# Patient Record
Sex: MALE | Race: WHITE | Employment: OTHER | URBAN - METROPOLITAN AREA
[De-identification: names, ages, dates, MRNs, and addresses within clinical notes are randomized per-mention and may not be internally consistent; named-entity substitution may affect disease eponyms.]

---

## 2021-07-24 ENCOUNTER — HOSPITAL ENCOUNTER (INPATIENT)
Age: 61
LOS: 8 days | Discharge: HOME | DRG: 080 | End: 2021-08-01
Attending: EMERGENCY MEDICINE | Admitting: FAMILY MEDICINE
Payer: MEDICAID

## 2021-07-24 ENCOUNTER — APPOINTMENT (OUTPATIENT)
Dept: GENERAL RADIOLOGY | Age: 61
DRG: 080 | End: 2021-07-24
Attending: EMERGENCY MEDICINE
Payer: MEDICAID

## 2021-07-24 DIAGNOSIS — H16.031: Primary | ICD-10-CM

## 2021-07-24 PROBLEM — C34.90 ADENOCARCINOMA OF LUNG (HCC): Status: ACTIVE | Noted: 2021-07-24

## 2021-07-24 PROBLEM — H16.001 CORNEAL ULCER, RIGHT: Status: ACTIVE | Noted: 2021-07-24

## 2021-07-24 PROBLEM — Z79.899 ON ANTINEOPLASTIC CHEMOTHERAPY: Status: ACTIVE | Noted: 2021-07-24

## 2021-07-24 LAB
ALBUMIN SERPL-MCNC: 3.7 G/DL (ref 3.2–4.6)
ALBUMIN/GLOB SERPL: 1 {RATIO} (ref 1.2–3.5)
ALP SERPL-CCNC: 105 U/L (ref 50–136)
ALT SERPL-CCNC: 32 U/L (ref 12–65)
ANION GAP SERPL CALC-SCNC: 4 MMOL/L (ref 7–16)
AST SERPL-CCNC: 11 U/L (ref 15–37)
BASOPHILS # BLD: 0 K/UL (ref 0–0.2)
BASOPHILS NFR BLD: 0 % (ref 0–2)
BILIRUB SERPL-MCNC: 0.3 MG/DL (ref 0.2–1.1)
BUN SERPL-MCNC: 17 MG/DL (ref 8–23)
CALCIUM SERPL-MCNC: 8.9 MG/DL (ref 8.3–10.4)
CHLORIDE SERPL-SCNC: 101 MMOL/L (ref 98–107)
CO2 SERPL-SCNC: 32 MMOL/L (ref 21–32)
CREAT SERPL-MCNC: 0.88 MG/DL (ref 0.8–1.5)
DIFFERENTIAL METHOD BLD: ABNORMAL
EOSINOPHIL # BLD: 0.1 K/UL (ref 0–0.8)
EOSINOPHIL NFR BLD: 2 % (ref 0.5–7.8)
ERYTHROCYTE [DISTWIDTH] IN BLOOD BY AUTOMATED COUNT: 14.6 % (ref 11.9–14.6)
GLOBULIN SER CALC-MCNC: 3.6 G/DL (ref 2.3–3.5)
GLUCOSE SERPL-MCNC: 117 MG/DL (ref 65–100)
HCT VFR BLD AUTO: 36.1 % (ref 41.1–50.3)
HGB BLD-MCNC: 11.6 G/DL (ref 13.6–17.2)
IMM GRANULOCYTES # BLD AUTO: 0.2 K/UL (ref 0–0.5)
IMM GRANULOCYTES NFR BLD AUTO: 4 % (ref 0–5)
LACTATE SERPL-SCNC: 0.7 MMOL/L (ref 0.4–2)
LYMPHOCYTES # BLD: 1.3 K/UL (ref 0.5–4.6)
LYMPHOCYTES NFR BLD: 28 % (ref 13–44)
MCH RBC QN AUTO: 29.7 PG (ref 26.1–32.9)
MCHC RBC AUTO-ENTMCNC: 32.1 G/DL (ref 31.4–35)
MCV RBC AUTO: 92.3 FL (ref 79.6–97.8)
MONOCYTES # BLD: 0.7 K/UL (ref 0.1–1.3)
MONOCYTES NFR BLD: 14 % (ref 4–12)
NEUTS SEG # BLD: 2.5 K/UL (ref 1.7–8.2)
NEUTS SEG NFR BLD: 52 % (ref 43–78)
NRBC # BLD: 0 K/UL (ref 0–0.2)
PLATELET # BLD AUTO: 249 K/UL (ref 150–450)
PMV BLD AUTO: 11.1 FL (ref 9.4–12.3)
POTASSIUM SERPL-SCNC: 3.9 MMOL/L (ref 3.5–5.1)
PROCALCITONIN SERPL-MCNC: <0.05 NG/ML
PROT SERPL-MCNC: 7.3 G/DL (ref 6.3–8.2)
RBC # BLD AUTO: 3.91 M/UL (ref 4.23–5.6)
SODIUM SERPL-SCNC: 137 MMOL/L (ref 136–145)
WBC # BLD AUTO: 4.8 K/UL (ref 4.3–11.1)

## 2021-07-24 PROCEDURE — 93005 ELECTROCARDIOGRAM TRACING: CPT | Performed by: EMERGENCY MEDICINE

## 2021-07-24 PROCEDURE — 80053 COMPREHEN METABOLIC PANEL: CPT

## 2021-07-24 PROCEDURE — 74011250636 HC RX REV CODE- 250/636: Performed by: EMERGENCY MEDICINE

## 2021-07-24 PROCEDURE — 74011000258 HC RX REV CODE- 258: Performed by: EMERGENCY MEDICINE

## 2021-07-24 PROCEDURE — 99284 EMERGENCY DEPT VISIT MOD MDM: CPT

## 2021-07-24 PROCEDURE — 85025 COMPLETE CBC W/AUTO DIFF WBC: CPT

## 2021-07-24 PROCEDURE — 99283 EMERGENCY DEPT VISIT LOW MDM: CPT

## 2021-07-24 PROCEDURE — 65270000029 HC RM PRIVATE

## 2021-07-24 PROCEDURE — 83605 ASSAY OF LACTIC ACID: CPT

## 2021-07-24 PROCEDURE — 87040 BLOOD CULTURE FOR BACTERIA: CPT

## 2021-07-24 PROCEDURE — 84145 PROCALCITONIN (PCT): CPT

## 2021-07-24 PROCEDURE — 94762 N-INVAS EAR/PLS OXIMTRY CONT: CPT

## 2021-07-24 PROCEDURE — 74011250636 HC RX REV CODE- 250/636: Performed by: FAMILY MEDICINE

## 2021-07-24 PROCEDURE — 74011250637 HC RX REV CODE- 250/637: Performed by: FAMILY MEDICINE

## 2021-07-24 PROCEDURE — 71045 X-RAY EXAM CHEST 1 VIEW: CPT

## 2021-07-24 RX ORDER — FOLIC ACID 1 MG/1
1 TABLET ORAL DAILY
COMMUNITY

## 2021-07-24 RX ORDER — SODIUM CHLORIDE 0.9 % (FLUSH) 0.9 %
5-40 SYRINGE (ML) INJECTION AS NEEDED
Status: DISCONTINUED | OUTPATIENT
Start: 2021-07-24 | End: 2021-08-01 | Stop reason: HOSPADM

## 2021-07-24 RX ORDER — ATORVASTATIN CALCIUM 80 MG/1
80 TABLET, FILM COATED ORAL
COMMUNITY

## 2021-07-24 RX ORDER — ENOXAPARIN SODIUM 100 MG/ML
40 INJECTION SUBCUTANEOUS DAILY
Status: DISCONTINUED | OUTPATIENT
Start: 2021-07-25 | End: 2021-08-01 | Stop reason: HOSPADM

## 2021-07-24 RX ORDER — DOXYCYCLINE 100 MG/1
100 CAPSULE ORAL 2 TIMES DAILY
COMMUNITY
End: 2021-08-01

## 2021-07-24 RX ORDER — MELATONIN
1000 2 TIMES DAILY
Status: DISCONTINUED | OUTPATIENT
Start: 2021-07-24 | End: 2021-08-01 | Stop reason: HOSPADM

## 2021-07-24 RX ORDER — ONDANSETRON 2 MG/ML
4 INJECTION INTRAMUSCULAR; INTRAVENOUS
Status: DISCONTINUED | OUTPATIENT
Start: 2021-07-24 | End: 2021-08-01 | Stop reason: HOSPADM

## 2021-07-24 RX ORDER — SPIRONOLACTONE 25 MG/1
25 TABLET ORAL DAILY
COMMUNITY

## 2021-07-24 RX ORDER — FUROSEMIDE 40 MG/1
40 TABLET ORAL DAILY
COMMUNITY

## 2021-07-24 RX ORDER — MOXIFLOXACIN 5 MG/ML
1 SOLUTION/ DROPS OPHTHALMIC
COMMUNITY

## 2021-07-24 RX ORDER — SODIUM CHLORIDE 0.9 % (FLUSH) 0.9 %
5-40 SYRINGE (ML) INJECTION EVERY 8 HOURS
Status: DISCONTINUED | OUTPATIENT
Start: 2021-07-24 | End: 2021-07-27

## 2021-07-24 RX ORDER — VITAMIN E 1000 UNIT
1000 CAPSULE ORAL DAILY
COMMUNITY

## 2021-07-24 RX ORDER — CIPROFLOXACIN 500 MG/1
500 TABLET ORAL 2 TIMES DAILY
COMMUNITY
End: 2021-08-01

## 2021-07-24 RX ORDER — ACETAMINOPHEN 325 MG/1
650 TABLET ORAL
Status: DISCONTINUED | OUTPATIENT
Start: 2021-07-24 | End: 2021-08-01 | Stop reason: HOSPADM

## 2021-07-24 RX ORDER — FUROSEMIDE 40 MG/1
40 TABLET ORAL DAILY
Status: DISCONTINUED | OUTPATIENT
Start: 2021-07-25 | End: 2021-08-01 | Stop reason: HOSPADM

## 2021-07-24 RX ORDER — CLOPIDOGREL BISULFATE 75 MG/1
75 TABLET ORAL DAILY
COMMUNITY

## 2021-07-24 RX ORDER — SODIUM CHLORIDE 0.9 % (FLUSH) 0.9 %
5-10 SYRINGE (ML) INJECTION EVERY 8 HOURS
Status: DISCONTINUED | OUTPATIENT
Start: 2021-07-24 | End: 2021-08-01 | Stop reason: HOSPADM

## 2021-07-24 RX ORDER — POLYETHYLENE GLYCOL 3350 17 G/17G
17 POWDER, FOR SOLUTION ORAL DAILY PRN
Status: DISCONTINUED | OUTPATIENT
Start: 2021-07-24 | End: 2021-08-01 | Stop reason: HOSPADM

## 2021-07-24 RX ORDER — SODIUM CHLORIDE 0.9 % (FLUSH) 0.9 %
5-10 SYRINGE (ML) INJECTION AS NEEDED
Status: DISCONTINUED | OUTPATIENT
Start: 2021-07-24 | End: 2021-08-01 | Stop reason: HOSPADM

## 2021-07-24 RX ORDER — SPIRONOLACTONE 25 MG/1
25 TABLET ORAL DAILY
Status: DISCONTINUED | OUTPATIENT
Start: 2021-07-25 | End: 2021-08-01 | Stop reason: HOSPADM

## 2021-07-24 RX ORDER — ASPIRIN 81 MG/1
81 TABLET ORAL DAILY
COMMUNITY

## 2021-07-24 RX ORDER — VANCOMYCIN 1.75 GRAM/500 ML IN 0.9 % SODIUM CHLORIDE INTRAVENOUS
1750 ONCE
Status: COMPLETED | OUTPATIENT
Start: 2021-07-24 | End: 2021-07-25

## 2021-07-24 RX ORDER — TOBRAMYCIN 3 MG/ML
1 SOLUTION/ DROPS OPHTHALMIC
COMMUNITY
End: 2021-08-01

## 2021-07-24 RX ORDER — ONDANSETRON 4 MG/1
4 TABLET, ORALLY DISINTEGRATING ORAL
Status: DISCONTINUED | OUTPATIENT
Start: 2021-07-24 | End: 2021-08-01 | Stop reason: HOSPADM

## 2021-07-24 RX ORDER — ACETAMINOPHEN 650 MG/1
650 SUPPOSITORY RECTAL
Status: DISCONTINUED | OUTPATIENT
Start: 2021-07-24 | End: 2021-08-01 | Stop reason: HOSPADM

## 2021-07-24 RX ORDER — MOXIFLOXACIN 5 MG/ML
1 SOLUTION/ DROPS OPHTHALMIC
Status: DISCONTINUED | OUTPATIENT
Start: 2021-07-24 | End: 2021-07-27 | Stop reason: CLARIF

## 2021-07-24 RX ADMIN — VANCOMYCIN HYDROCHLORIDE 1 DROP: 1 INJECTION, POWDER, LYOPHILIZED, FOR SOLUTION INTRAVENOUS at 23:00

## 2021-07-24 RX ADMIN — VANCOMYCIN HYDROCHLORIDE 1 DROP: 1 INJECTION, POWDER, LYOPHILIZED, FOR SOLUTION INTRAVENOUS at 22:04

## 2021-07-24 RX ADMIN — TOBRAMYCIN 1 DROP: 3 SOLUTION OPHTHALMIC at 22:04

## 2021-07-24 RX ADMIN — ACETAMINOPHEN 650 MG: 325 TABLET ORAL at 22:02

## 2021-07-24 RX ADMIN — VANCOMYCIN HYDROCHLORIDE 1750 MG: 10 INJECTION, POWDER, LYOPHILIZED, FOR SOLUTION INTRAVENOUS at 22:06

## 2021-07-24 RX ADMIN — PIPERACILLIN SODIUM AND TAZOBACTAM SODIUM 4.5 G: 4; .5 INJECTION, POWDER, LYOPHILIZED, FOR SOLUTION INTRAVENOUS at 18:02

## 2021-07-24 RX ADMIN — Medication 5 ML: at 22:10

## 2021-07-24 RX ADMIN — TOBRAMYCIN 1 DROP: 3 SOLUTION OPHTHALMIC at 20:05

## 2021-07-24 RX ADMIN — MOXIFLOXACIN 1 DROP: 5 SOLUTION/ DROPS OPHTHALMIC at 21:00

## 2021-07-24 RX ADMIN — VANCOMYCIN HYDROCHLORIDE 1 DROP: 1 INJECTION, POWDER, LYOPHILIZED, FOR SOLUTION INTRAVENOUS at 20:05

## 2021-07-24 RX ADMIN — SODIUM CHLORIDE, SODIUM LACTATE, POTASSIUM CHLORIDE, AND CALCIUM CHLORIDE 1000 ML: 600; 310; 30; 20 INJECTION, SOLUTION INTRAVENOUS at 16:05

## 2021-07-24 RX ADMIN — VANCOMYCIN HYDROCHLORIDE 1 DROP: 1 INJECTION, POWDER, LYOPHILIZED, FOR SOLUTION INTRAVENOUS at 21:00

## 2021-07-24 RX ADMIN — MOXIFLOXACIN 1 DROP: 5 SOLUTION/ DROPS OPHTHALMIC at 22:04

## 2021-07-24 RX ADMIN — TOBRAMYCIN 1 DROP: 3 SOLUTION OPHTHALMIC at 23:00

## 2021-07-24 RX ADMIN — MOXIFLOXACIN 1 DROP: 5 SOLUTION/ DROPS OPHTHALMIC at 20:05

## 2021-07-24 RX ADMIN — MOXIFLOXACIN 1 DROP: 5 SOLUTION/ DROPS OPHTHALMIC at 23:00

## 2021-07-24 RX ADMIN — TOBRAMYCIN 1 DROP: 3 SOLUTION OPHTHALMIC at 21:00

## 2021-07-24 NOTE — ED NOTES
TRANSFER - OUT REPORT:    Verbal report given to Neftaly Dooley (name) on Arnoldo Cantu  being transferred to 3rd floor (unit) for routine progression of care       Report consisted of patients Situation, Background, Assessment and   Recommendations(SBAR). Information from the following report(s) SBAR, ED Summary and MAR was reviewed with the receiving nurse. Lines:   Peripheral IV 07/24/21 Right Antecubital (Active)   Site Assessment Clean, dry, & intact 07/24/21 1447   Phlebitis Assessment 0 07/24/21 1447   Infiltration Assessment 0 07/24/21 1447   Dressing Status Clean, dry, & intact 07/24/21 1447       Peripheral IV 07/24/21 Left Hand (Active)        Opportunity for questions and clarification was provided.       Patient transported with:   "[employer]

## 2021-07-24 NOTE — ROUTINE PROCESS
TRANSFER - IN REPORT:    Verbal report received from ER RN on Virginia Evans being received from ER for routine progression of care. Report consisted of patients Situation, Background, Assessment and Recommendations(SBAR). Information from the following report(s) SBAR, Kardex, ED Summary, Intake/Output and MAR was reviewed. Opportunity for questions and clarification was provided. Report taken for Primary RN Jens. Pt is nonmonitored.

## 2021-07-24 NOTE — ED PROVIDER NOTES
49-year-old male complains of right eye infection starting over a week ago. Patient states the initial insult was a foreign body of the eye when he was working out in the yard. He followed up at [facility] emergency department a couple days following that for eye pain and foreign body sensation, was eventually discharged home on amoxicillin orally and some antibiotic ointment. Patient continued to do poorly and followed up on Monday with ophthalmology. At that time he was placed on oral Cipro, doxycycline, tobramycin ophthalmic drops every hour, moxifloxacin ophthalmic drops every hour and vancomycin eyedrops. Patient was also placed on vitamin D twice daily. On recheck today the patient's condition had worsened significantly, and attempts to directly admit the patient via the ophthalmologist were unsuccessful due to no beds. Patient was sent to the ER for IV antibiotics and admission by the hospitalist, and Dr. Kolby Sadler of [facility] eye will consult. He requested patient be n.p.o. after midnight for possible surgical debridement tomorrow if necessary. The history is provided by the patient. Eye Pain   This is a new problem. The current episode started more than 1 week ago. The problem occurs constantly. The problem has been gradually worsening. The right eye is affected. The injury mechanism was a foreign body. The patient is experiencing no pain. There is history of trauma to the eye. There is no known exposure to pink eye. He does not wear contacts. Associated symptoms include blurred vision, decreased vision, eye redness and pain. Pertinent negatives include no numbness, no discharge, no double vision, no foreign body sensation, no photophobia, no nausea, no vomiting, no tingling, no weakness, no itching, no fever, no head injury and no dizziness. Treatments tried: Biotic eyedrops. No past medical history on file. No past surgical history on file. No family history on file.     Social History Socioeconomic History    Marital status: Not on file     Spouse name: Not on file    Number of children: Not on file    Years of education: Not on file    Highest education level: Not on file   Occupational History    Not on file   Tobacco Use    Smoking status: Not on file   Substance and Sexual Activity    Alcohol use: Not on file    Drug use: Not on file    Sexual activity: Not on file   Other Topics Concern    Not on file   Social History Narrative    Not on file     Social Determinants of Health     Financial Resource Strain:     Difficulty of Paying Living Expenses:    Food Insecurity:     Worried About Running Out of Food in the Last Year:     [address] in the Last Year:    Transportation Needs:     Lack of Transportation (Medical):  Lack of Transportation (Non-Medical):    Physical Activity:     Days of Exercise per Week:     Minutes of Exercise per Session:    Stress:     Feeling of Stress :    Social Connections:     Frequency of Communication with Friends and Family:     Frequency of Social Gatherings with Friends and Family:     Attends [employer] Services:     Active Member of Clubs or Organizations:     Attends Club or Organization Meetings:     Marital Status:    Intimate Partner Violence:     Fear of Current or Ex-Partner:     Emotionally Abused:     Physically Abused:     Sexually Abused: ALLERGIES: Patient has no known allergies. Review of Systems   Constitutional: Negative for chills and fever. Eyes: Positive for blurred vision, pain, redness and visual disturbance. Negative for double vision, photophobia and discharge. Gastrointestinal: Negative for nausea and vomiting. Skin: Negative for itching. Neurological: Negative for dizziness, tingling, weakness and numbness. All other systems reviewed and are negative.       Vitals:    07/24/21 1445   BP: 126/78   Pulse: 87   Resp: 16   Temp: 98.1 °F (36.7 °C)   SpO2: 97%   Weight: 72.6 kg (160 lb)   Height: 5' 6\" (1.676 m)            Physical Exam  Vitals and nursing note reviewed. Constitutional:       General: He is not in acute distress. Appearance: He is well-developed. HENT:      Head: Normocephalic and atraumatic. Right Ear: External ear normal.      Left Ear: External ear normal.   Eyes:      General: Lids are normal.         Right eye: Discharge present. Left eye: No foreign body, discharge or hordeolum. Extraocular Movements: Extraocular movements intact. Conjunctiva/sclera:      Right eye: Right conjunctiva is injected. Chemosis and exudate present. No hemorrhage. Left eye: Left conjunctiva is not injected. No chemosis, exudate or hemorrhage. Slit lamp exam:     Right eye: Hypopyon present. Cardiovascular:      Rate and Rhythm: Normal rate and regular rhythm. Heart sounds: Normal heart sounds. Pulmonary:      Effort: Pulmonary effort is normal.      Breath sounds: Normal breath sounds. Abdominal:      General: Bowel sounds are normal.      Palpations: Abdomen is soft. Tenderness: There is no abdominal tenderness. Musculoskeletal:         General: Normal range of motion. Cervical back: Normal range of motion and neck supple. Skin:     General: Skin is warm and dry. Capillary Refill: Capillary refill takes less than 2 seconds. Neurological:      Mental Status: He is alert and oriented to person, place, and time. Cranial Nerves: Cranial nerves are intact. Sensory: Sensation is intact. Motor: Motor function is intact.    Psychiatric:         Mood and Affect: Mood normal.         Speech: Speech normal.         Cognition and Memory: Cognition and memory normal.          MDM  Number of Diagnoses or Management Options  Corneal ulcer, hypopyon, right: new and requires workup     Amount and/or Complexity of Data Reviewed  Clinical lab tests: ordered and reviewed  Review and summarize past medical records: yes  Discuss the patient with other providers: yes    Risk of Complications, Morbidity, and/or Mortality  Presenting problems: moderate  Diagnostic procedures: minimal  Management options: moderate    Patient Progress  Patient progress: stable         Procedures    The patient was observed in the ED. discussed case with Dr. Maci Mills of ophthalmology, recommends IV antibiotics and admission by hospitalist and n.p.o. after midnight. Patient is to continue his tobramycin and moxifloxacin eyedrops. He stated the doxycycline was primarily for the anti-inflammatory properties as well as the vitamin D 1000 mg twice daily. Results Reviewed:      Recent Results (from the past 24 hour(s))   LACTIC ACID    Collection Time: 07/24/21  2:52 PM   Result Value Ref Range    Lactic acid 0.7 0.4 - 2.0 MMOL/L   CBC WITH AUTOMATED DIFF    Collection Time: 07/24/21  2:52 PM   Result Value Ref Range    WBC 4.8 4.3 - 11.1 K/uL    RBC 3.91 (L) 4.23 - 5.6 M/uL    HGB 11.6 (L) 13.6 - 17.2 g/dL    HCT 36.1 (L) 41.1 - 50.3 %    MCV 92.3 79.6 - 97.8 FL    MCH 29.7 26.1 - 32.9 PG    MCHC 32.1 31.4 - 35.0 g/dL    RDW 14.6 11.9 - 14.6 %    PLATELET 979 722 - 371 K/uL    MPV 11.1 9.4 - 12.3 FL    ABSOLUTE NRBC 0.00 0.0 - 0.2 K/uL    DF AUTOMATED      NEUTROPHILS 52 43 - 78 %    LYMPHOCYTES 28 13 - 44 %    MONOCYTES 14 (H) 4.0 - 12.0 %    EOSINOPHILS 2 0.5 - 7.8 %    BASOPHILS 0 0.0 - 2.0 %    IMMATURE GRANULOCYTES 4 0.0 - 5.0 %    ABS. NEUTROPHILS 2.5 1.7 - 8.2 K/UL    ABS. LYMPHOCYTES 1.3 0.5 - 4.6 K/UL    ABS. MONOCYTES 0.7 0.1 - 1.3 K/UL    ABS. EOSINOPHILS 0.1 0.0 - 0.8 K/UL    ABS. BASOPHILS 0.0 0.0 - 0.2 K/UL    ABS. IMM.  GRANS. 0.2 0.0 - 0.5 K/UL   METABOLIC PANEL, COMPREHENSIVE    Collection Time: 07/24/21  2:52 PM   Result Value Ref Range    Sodium 137 136 - 145 mmol/L    Potassium 3.9 3.5 - 5.1 mmol/L    Chloride 101 98 - 107 mmol/L    CO2 32 21 - 32 mmol/L    Anion gap 4 (L) 7 - 16 mmol/L    Glucose 117 (H) 65 - 100 mg/dL    BUN 17 8 - 23 MG/DL    Creatinine 0.88 0.8 - 1.5 MG/DL    GFR est AA >60 >60 ml/min/1.73m2    GFR est non-AA >60 >60 ml/min/1.73m2    Calcium 8.9 8.3 - 10.4 MG/DL    Bilirubin, total 0.3 0.2 - 1.1 MG/DL    ALT (SGPT) 32 12 - 65 U/L    AST (SGOT) 11 (L) 15 - 37 U/L    Alk. phosphatase 105 50 - 136 U/L    Protein, total 7.3 6.3 - 8.2 g/dL    Albumin 3.7 3.2 - 4.6 g/dL    Globulin 3.6 (H) 2.3 - 3.5 g/dL    A-G Ratio 1.0 (L) 1.2 - 3.5     PROCALCITONIN    Collection Time: 07/24/21  2:52 PM   Result Value Ref Range    Procalcitonin <0.05 ng/mL   EKG, 12 LEAD, INITIAL    Collection Time: 07/24/21  3:26 PM   Result Value Ref Range    Ventricular Rate 82 BPM    Atrial Rate 82 BPM    P-R Interval 200 ms    QRS Duration 90 ms    Q-T Interval 346 ms    QTC Calculation (Bezet) 404 ms    Calculated P Axis 75 degrees    Calculated R Axis 78 degrees    Calculated T Axis -80 degrees    Diagnosis       !! AGE AND GENDER SPECIFIC ECG ANALYSIS !!   Normal sinus rhythm  Possible Anteroseptal infarct , age undetermined  T wave abnormality, consider inferolateral ischemia  Abnormal ECG  No previous ECGs available

## 2021-07-24 NOTE — H&P
Ar Hospitalist Note     Admit Date:  2021  3:18 PM   Name:  Casa Dunn   Age:  59 y.o.  :  1960   MRN:  [id number]   PCP:  None  Treatment Team: Attending Provider: Beth Simms MD; Primary Nurse: Huy Lopez RN    HPI/Subjective:     Patient is a 59-year-old man with past medical history significant for CAD, adenocarcinoma of lung, on chemotherapy presented to the ED referred by ophthalmology for failed outpatient therapy for eye infection. Patient reports he was working in his yard on  when a piece of grass flew into his right eye. He flushed his eye with warm tap water but next day he woke up with blurred vision, painful red eye. He was seen at [facility] ED and was discharged home on amoxicillin and some antibiotic ointment. Patient reports symptoms persistent and he was seen by ophthalmology on  and was placed on oral Cipro, doxycycline and tobramycin ophthalmic drops every hour, moxifloxacin ophthalmic drops every hour and vancomycin eyedrops. Patient reports symptom has had worsened significantly with persistent discharge, and no vision in RT eye. He was evaluated by ophthalmology who tried to admit patient directly but no bed was available. Patient was sent to the ER for IV antibiotics and Dr. Kwesi Plaza of [facility] will see patient in the morning. Patient denies any other symptoms. Denies fever, chills, nausea, vomiting, abdominal pain, shortness of breath or chest pain. In the ED patient was vitally stable. Labs unremarkable. Hospitalist consulted for admission.     Assessment and Plan:     Right corneal ulcer/hypopyon:  Cultures from [facility] showed pansensitive Pseudomonas  Start patient on vancomycin/Zosyn, pharmacy consulted to adjust dose  Dr. Kwesi Plaza of [address] consulted  ED spoke to Dr. Kwesi Plaza who recommend patient be n.p.o. after midnight for possible surgical debridement tomorrow  Continue tobramycin and moxifloxacin eyedrops  Continue vitamin D 1000 mg twice daily    CHF/CAD:  Continue Lasix, carvedilol and spironolactone    Adenocarcinoma of lung, on chemotherapy:  Outpatient oncology follow-up    Discharge planning: Admit patient to the medical floor    DVT ppx ordered  Code status:  Full  Estimated LOS:  Greater than 2 midnights  Risk:  high    Hospital Problems as of 7/24/2021 Never Reviewed        Codes Class Noted - Resolved POA    Corneal ulcer, right ICD-10-CM: H16.001  ICD-9-CM: 370.00  7/24/2021 - Present Unknown        Adenocarcinoma of lung ([facility] [address] ICD-10-CM: C34.90  ICD-9-CM: 162.9  7/24/2021 - Present Unknown        On antineoplastic chemotherapy ICD-10-CM: Z79.899  ICD-9-CM: V58.69  7/24/2021 - Present Unknown                10 systems reviewed and negative except as noted in HPI. No past medical history on file. No past surgical history on file. No Known Allergies   Social History     Tobacco Use    Smoking status: Not on file   Substance Use Topics    Alcohol use: Not on file      No family history on file. Family history reviewed and noncontributory. There is no immunization history on file for this patient. PTA Medications:  None       Objective:     Patient Vitals for the past 24 hrs:   Temp Pulse Resp BP SpO2   07/24/21 1644  81   97 %   07/24/21 1555  91  132/85 98 %   07/24/21 1445 98.1 °F (36.7 °C) 87 16 126/78 97 %     Oxygen Therapy  O2 Sat (%): 97 % (07/24/21 1644)  Pulse via Oximetry: 82 beats per minute (07/24/21 1644)  O2 Device: None (Room air) (07/24/21 1445)    Estimated body mass index is 25.82 kg/m² as calculated from the following:    Height as of this encounter: 5' 6\" (1.676 m). Weight as of this encounter: 72.6 kg (160 lb).       Intake/Output Summary (Last 24 hours) at 7/24/2021 1759  Last data filed at 7/24/2021 1751  Gross per 24 hour   Intake 1000 ml   Output    Net 1000 ml       *Note that automatically entered I/Os may not be accurate; dependent on patient compliance with collection and accurate  by assistants. Physical Exam:  General:    Well nourished. Alert. Eyes:   Right eye with large amount of discharge to the cornea, visible hypopyon and swollen injected sclera  HENT:  Normocephalic, atraumatic. Moist mucous membranes  CV:   RRR. No m/r/g. Lungs:  CTAB. No wheezing, rhonchi, or rales. Abdomen: Soft, nontender, nondistended. Extremities: Warm and dry. No cyanosis or edema. Neurologic: CN II-XII grossly intact. Sensation intact. Skin:     No rashes or jaundice. Normal coloration  Psych:  Normal mood and affect. I reviewed the labs, imaging, EKGs, telemetry, and other studies done this admission. Data Reviewed:   Recent Results (from the past 24 hour(s))   LACTIC ACID    Collection Time: 07/24/21  2:52 PM   Result Value Ref Range    Lactic acid 0.7 0.4 - 2.0 MMOL/L   CBC WITH AUTOMATED DIFF    Collection Time: 07/24/21  2:52 PM   Result Value Ref Range    WBC 4.8 4.3 - 11.1 K/uL    RBC 3.91 (L) 4.23 - 5.6 M/uL    HGB 11.6 (L) 13.6 - 17.2 g/dL    HCT 36.1 (L) 41.1 - 50.3 %    MCV 92.3 79.6 - 97.8 FL    MCH 29.7 26.1 - 32.9 PG    MCHC 32.1 31.4 - 35.0 g/dL    RDW 14.6 11.9 - 14.6 %    PLATELET 691 867 - 646 K/uL    MPV 11.1 9.4 - 12.3 FL    ABSOLUTE NRBC 0.00 0.0 - 0.2 K/uL    DF AUTOMATED      NEUTROPHILS 52 43 - 78 %    LYMPHOCYTES 28 13 - 44 %    MONOCYTES 14 (H) 4.0 - 12.0 %    EOSINOPHILS 2 0.5 - 7.8 %    BASOPHILS 0 0.0 - 2.0 %    IMMATURE GRANULOCYTES 4 0.0 - 5.0 %    ABS. NEUTROPHILS 2.5 1.7 - 8.2 K/UL    ABS. LYMPHOCYTES 1.3 0.5 - 4.6 K/UL    ABS. MONOCYTES 0.7 0.1 - 1.3 K/UL    ABS. EOSINOPHILS 0.1 0.0 - 0.8 K/UL    ABS. BASOPHILS 0.0 0.0 - 0.2 K/UL    ABS. IMM.  GRANS. 0.2 0.0 - 0.5 K/UL   METABOLIC PANEL, COMPREHENSIVE    Collection Time: 07/24/21  2:52 PM   Result Value Ref Range    Sodium 137 136 - 145 mmol/L    Potassium 3.9 3.5 - 5.1 mmol/L    Chloride 101 98 - 107 mmol/L    CO2 32 21 - 32 mmol/L    Anion gap 4 (L) 7 - 16 mmol/L    Glucose 117 (H) 65 - 100 mg/dL BUN 17 8 - 23 MG/DL    Creatinine 0.88 0.8 - 1.5 MG/DL    GFR est AA >60 >60 ml/min/1.73m2    GFR est non-AA >60 >60 ml/min/1.73m2    Calcium 8.9 8.3 - 10.4 MG/DL    Bilirubin, total 0.3 0.2 - 1.1 MG/DL    ALT (SGPT) 32 12 - 65 U/L    AST (SGOT) 11 (L) 15 - 37 U/L    Alk. phosphatase 105 50 - 136 U/L    Protein, total 7.3 6.3 - 8.2 g/dL    Albumin 3.7 3.2 - 4.6 g/dL    Globulin 3.6 (H) 2.3 - 3.5 g/dL    A-G Ratio 1.0 (L) 1.2 - 3.5     PROCALCITONIN    Collection Time: 07/24/21  2:52 PM   Result Value Ref Range    Procalcitonin <0.05 ng/mL   EKG, 12 LEAD, INITIAL    Collection Time: 07/24/21  3:26 PM   Result Value Ref Range    Ventricular Rate 82 BPM    Atrial Rate 82 BPM    P-R Interval 200 ms    QRS Duration 90 ms    Q-T Interval 346 ms    QTC Calculation (Bezet) 404 ms    Calculated P Axis 75 degrees    Calculated R Axis 78 degrees    Calculated T Axis -80 degrees    Diagnosis       !! AGE AND GENDER SPECIFIC ECG ANALYSIS !!   Normal sinus rhythm  Possible Anteroseptal infarct , age undetermined  T wave abnormality, consider inferolateral ischemia  Abnormal ECG  No previous ECGs available         All Micro Results     Procedure Component Value Units Date/Time    BLOOD CULTURE [[id number]] Collected: 07/24/21 1530    Order Status: Completed Specimen: Blood Updated: 07/24/21 1737    BLOOD CULTURE [[id number]] Collected: 07/24/21 1452    Order Status: Completed Specimen: Blood Updated: 07/24/21 1509          Current Facility-Administered Medications   Medication Dose Route Frequency    sodium chloride (NS) flush 5-10 mL  5-10 mL IntraVENous Q8H    sodium chloride (NS) flush 5-10 mL  5-10 mL IntraVENous PRN    piperacillin-tazobactam (ZOSYN) 4.5 g in 0.9% sodium chloride (MBP/ADV) 100 mL MBP  4.5 g IntraVENous NOW    piperacillin-tazobactam (ZOSYN) 4.5 g in 0.9% sodium chloride (MBP/ADV) 100 mL MBP  4.5 g IntraVENous Q8H    [START ON 7/25/2021] spironolactone (ALDACTONE) tablet 25 mg  25 mg Oral DAILY    [START ON 7/25/2021] furosemide (LASIX) tablet 40 mg  40 mg Oral DAILY    vancomycin (VANCOCIN) 1750 mg in  ml infusion  1,750 mg IntraVENous ONCE    cholecalciferol (VITAMIN D3) (1000 Units /25 mcg) tablet 1,000 Units  1,000 Units Oral BID     No current outpatient medications on file. Other Studies:  No results found for this visit on 07/24/21. XR CHEST PORT    Result Date: 7/24/2021  History: Infection Exam: portable chest Comparison: None Findings: A port overlies the right chest wall. No focal alveolar infiltrate. Median sternotomy wires present. No pneumothorax. IMPRESSIONs: No acute findings. SARS-CoV-2 Lab Results  \"Novel Coronavirus\" Test: No results found for: COV2NT   \"Emergent Disease\" Test: No results found for: EDPR  \"SARS-COV-2\" Test: No results found for: XGCOVT  Rapid Test: No results found for: COVR            Part of this note was written by using a voice dictation software and the note has been proof read but may still contain some grammatical/other typographical errors.     Signed:  Francisco Chang MD

## 2021-07-24 NOTE — ED TRIAGE NOTES
Pt ambulatory to triage. Pt reports being sent by Dr. Kwesi Plaza for IV ABT. Pt has been on ABT gtt for one week and has no improvement. Pt states he was given oral ABT and ABT ointment from [facility] last Thursday. Pt states started after he had something fly in it while weedeating the yard.

## 2021-07-25 LAB
ANION GAP SERPL CALC-SCNC: 2 MMOL/L (ref 7–16)
ATRIAL RATE: 82 BPM
BASOPHILS # BLD: 0 K/UL (ref 0–0.2)
BASOPHILS NFR BLD: 1 % (ref 0–2)
BUN SERPL-MCNC: 14 MG/DL (ref 8–23)
CALCIUM SERPL-MCNC: 9 MG/DL (ref 8.3–10.4)
CALCULATED P AXIS, ECG09: 75 DEGREES
CALCULATED R AXIS, ECG10: 78 DEGREES
CALCULATED T AXIS, ECG11: -80 DEGREES
CHLORIDE SERPL-SCNC: 106 MMOL/L (ref 98–107)
CO2 SERPL-SCNC: 31 MMOL/L (ref 21–32)
CREAT SERPL-MCNC: 0.71 MG/DL (ref 0.8–1.5)
DIAGNOSIS, 93000: NORMAL
DIFFERENTIAL METHOD BLD: ABNORMAL
EOSINOPHIL # BLD: 0.1 K/UL (ref 0–0.8)
EOSINOPHIL NFR BLD: 1 % (ref 0.5–7.8)
ERYTHROCYTE [DISTWIDTH] IN BLOOD BY AUTOMATED COUNT: 14.6 % (ref 11.9–14.6)
EST. AVERAGE GLUCOSE BLD GHB EST-MCNC: 117 MG/DL
GLUCOSE BLD STRIP.AUTO-MCNC: 118 MG/DL (ref 65–100)
GLUCOSE BLD STRIP.AUTO-MCNC: 148 MG/DL (ref 65–100)
GLUCOSE SERPL-MCNC: 98 MG/DL (ref 65–100)
HBA1C MFR BLD: 5.7 % (ref 4.2–6.3)
HCT VFR BLD AUTO: 39.4 % (ref 41.1–50.3)
HGB BLD-MCNC: 12.5 G/DL (ref 13.6–17.2)
IMM GRANULOCYTES # BLD AUTO: 0.3 K/UL (ref 0–0.5)
IMM GRANULOCYTES NFR BLD AUTO: 5 % (ref 0–5)
LYMPHOCYTES # BLD: 1.4 K/UL (ref 0.5–4.6)
LYMPHOCYTES NFR BLD: 24 % (ref 13–44)
MCH RBC QN AUTO: 29.8 PG (ref 26.1–32.9)
MCHC RBC AUTO-ENTMCNC: 31.7 G/DL (ref 31.4–35)
MCV RBC AUTO: 94 FL (ref 79.6–97.8)
MONOCYTES # BLD: 0.7 K/UL (ref 0.1–1.3)
MONOCYTES NFR BLD: 12 % (ref 4–12)
NEUTS SEG # BLD: 3.3 K/UL (ref 1.7–8.2)
NEUTS SEG NFR BLD: 57 % (ref 43–78)
NRBC # BLD: 0.02 K/UL (ref 0–0.2)
P-R INTERVAL, ECG05: 200 MS
PLATELET # BLD AUTO: 220 K/UL (ref 150–450)
PMV BLD AUTO: 11.6 FL (ref 9.4–12.3)
POTASSIUM SERPL-SCNC: 4 MMOL/L (ref 3.5–5.1)
Q-T INTERVAL, ECG07: 346 MS
QRS DURATION, ECG06: 90 MS
QTC CALCULATION (BEZET), ECG08: 404 MS
RBC # BLD AUTO: 4.19 M/UL (ref 4.23–5.6)
SERVICE CMNT-IMP: ABNORMAL
SERVICE CMNT-IMP: ABNORMAL
SODIUM SERPL-SCNC: 139 MMOL/L (ref 136–145)
VENTRICULAR RATE, ECG03: 82 BPM
WBC # BLD AUTO: 5.8 K/UL (ref 4.3–11.1)

## 2021-07-25 PROCEDURE — 65270000029 HC RM PRIVATE

## 2021-07-25 PROCEDURE — 74011250637 HC RX REV CODE- 250/637: Performed by: FAMILY MEDICINE

## 2021-07-25 PROCEDURE — 74011250636 HC RX REV CODE- 250/636: Performed by: FAMILY MEDICINE

## 2021-07-25 PROCEDURE — 2709999900 HC NON-CHARGEABLE SUPPLY

## 2021-07-25 PROCEDURE — 74011250636 HC RX REV CODE- 250/636: Performed by: INTERNAL MEDICINE

## 2021-07-25 PROCEDURE — 74011000258 HC RX REV CODE- 258: Performed by: FAMILY MEDICINE

## 2021-07-25 PROCEDURE — 80048 BASIC METABOLIC PNL TOTAL CA: CPT

## 2021-07-25 PROCEDURE — 36415 COLL VENOUS BLD VENIPUNCTURE: CPT

## 2021-07-25 PROCEDURE — 82962 GLUCOSE BLOOD TEST: CPT

## 2021-07-25 PROCEDURE — 83036 HEMOGLOBIN GLYCOSYLATED A1C: CPT

## 2021-07-25 PROCEDURE — 74011250636 HC RX REV CODE- 250/636

## 2021-07-25 PROCEDURE — 74011250637 HC RX REV CODE- 250/637: Performed by: INTERNAL MEDICINE

## 2021-07-25 PROCEDURE — 74011000250 HC RX REV CODE- 250: Performed by: FAMILY MEDICINE

## 2021-07-25 PROCEDURE — 74011000250 HC RX REV CODE- 250

## 2021-07-25 PROCEDURE — 85025 COMPLETE CBC W/AUTO DIFF WBC: CPT

## 2021-07-25 RX ORDER — OXYCODONE HYDROCHLORIDE 5 MG/1
5 TABLET ORAL
Status: DISCONTINUED | OUTPATIENT
Start: 2021-07-25 | End: 2021-08-01 | Stop reason: HOSPADM

## 2021-07-25 RX ORDER — SODIUM CHLORIDE, SODIUM LACTATE, POTASSIUM CHLORIDE, CALCIUM CHLORIDE 600; 310; 30; 20 MG/100ML; MG/100ML; MG/100ML; MG/100ML
75 INJECTION, SOLUTION INTRAVENOUS CONTINUOUS
Status: DISCONTINUED | OUTPATIENT
Start: 2021-07-25 | End: 2021-07-25

## 2021-07-25 RX ORDER — SODIUM CHLORIDE, SODIUM LACTATE, POTASSIUM CHLORIDE, CALCIUM CHLORIDE 600; 310; 30; 20 MG/100ML; MG/100ML; MG/100ML; MG/100ML
75 INJECTION, SOLUTION INTRAVENOUS CONTINUOUS
Status: DISPENSED | OUTPATIENT
Start: 2021-07-25 | End: 2021-07-26

## 2021-07-25 RX ORDER — ASPIRIN 81 MG/1
81 TABLET ORAL DAILY
Status: DISCONTINUED | OUTPATIENT
Start: 2021-07-25 | End: 2021-08-01 | Stop reason: HOSPADM

## 2021-07-25 RX ADMIN — MOXIFLOXACIN 1 DROP: 5 SOLUTION/ DROPS OPHTHALMIC at 07:55

## 2021-07-25 RX ADMIN — VANCOMYCIN HYDROCHLORIDE 1 DROP: 1 INJECTION, POWDER, LYOPHILIZED, FOR SOLUTION INTRAVENOUS at 05:10

## 2021-07-25 RX ADMIN — MOXIFLOXACIN 1 DROP: 5 SOLUTION/ DROPS OPHTHALMIC at 23:05

## 2021-07-25 RX ADMIN — VANCOMYCIN HYDROCHLORIDE 1 DROP: 1 INJECTION, POWDER, LYOPHILIZED, FOR SOLUTION INTRAVENOUS at 07:17

## 2021-07-25 RX ADMIN — ASPIRIN 81 MG: 81 TABLET ORAL at 10:21

## 2021-07-25 RX ADMIN — TOBRAMYCIN 1 DROP: 3 SOLUTION OPHTHALMIC at 18:20

## 2021-07-25 RX ADMIN — VANCOMYCIN HYDROCHLORIDE 1 DROP: 1 INJECTION, POWDER, LYOPHILIZED, FOR SOLUTION INTRAVENOUS at 02:55

## 2021-07-25 RX ADMIN — Medication 5 ML: at 20:40

## 2021-07-25 RX ADMIN — VANCOMYCIN HYDROCHLORIDE 1 DROP: 1 INJECTION, POWDER, LYOPHILIZED, FOR SOLUTION INTRAVENOUS at 20:05

## 2021-07-25 RX ADMIN — VANCOMYCIN HYDROCHLORIDE 1 DROP: 1 INJECTION, POWDER, LYOPHILIZED, FOR SOLUTION INTRAVENOUS at 16:52

## 2021-07-25 RX ADMIN — VITAMIN D, TAB 1000IU (100/BT) 1000 UNITS: 25 TAB at 09:04

## 2021-07-25 RX ADMIN — SPIRONOLACTONE 25 MG: 25 TABLET ORAL at 09:04

## 2021-07-25 RX ADMIN — MOXIFLOXACIN 1 DROP: 5 SOLUTION/ DROPS OPHTHALMIC at 18:19

## 2021-07-25 RX ADMIN — TOBRAMYCIN 1 DROP: 3 SOLUTION OPHTHALMIC at 15:58

## 2021-07-25 RX ADMIN — MOXIFLOXACIN 1 DROP: 5 SOLUTION/ DROPS OPHTHALMIC at 20:53

## 2021-07-25 RX ADMIN — TOBRAMYCIN 1 DROP: 3 SOLUTION OPHTHALMIC at 16:52

## 2021-07-25 RX ADMIN — MOXIFLOXACIN 1 DROP: 5 SOLUTION/ DROPS OPHTHALMIC at 15:09

## 2021-07-25 RX ADMIN — PIPERACILLIN SODIUM AND TAZOBACTAM SODIUM 4.5 G: 4; .5 INJECTION, POWDER, LYOPHILIZED, FOR SOLUTION INTRAVENOUS at 18:08

## 2021-07-25 RX ADMIN — VANCOMYCIN HYDROCHLORIDE 1000 MG: 1 INJECTION, POWDER, LYOPHILIZED, FOR SOLUTION INTRAVENOUS at 09:05

## 2021-07-25 RX ADMIN — VANCOMYCIN HYDROCHLORIDE 1 DROP: 1 INJECTION, POWDER, LYOPHILIZED, FOR SOLUTION INTRAVENOUS at 23:04

## 2021-07-25 RX ADMIN — TOBRAMYCIN 1 DROP: 3 SOLUTION OPHTHALMIC at 11:10

## 2021-07-25 RX ADMIN — MOXIFLOXACIN 1 DROP: 5 SOLUTION/ DROPS OPHTHALMIC at 07:15

## 2021-07-25 RX ADMIN — ACETAMINOPHEN 650 MG: 325 TABLET ORAL at 14:28

## 2021-07-25 RX ADMIN — TOBRAMYCIN 1 DROP: 3 SOLUTION OPHTHALMIC at 14:18

## 2021-07-25 RX ADMIN — VANCOMYCIN HYDROCHLORIDE 1 DROP: 1 INJECTION, POWDER, LYOPHILIZED, FOR SOLUTION INTRAVENOUS at 15:09

## 2021-07-25 RX ADMIN — VANCOMYCIN HYDROCHLORIDE 1 DROP: 1 INJECTION, POWDER, LYOPHILIZED, FOR SOLUTION INTRAVENOUS at 06:06

## 2021-07-25 RX ADMIN — TOBRAMYCIN 1 DROP: 3 SOLUTION OPHTHALMIC at 20:05

## 2021-07-25 RX ADMIN — VANCOMYCIN HYDROCHLORIDE 1 DROP: 1 INJECTION, POWDER, LYOPHILIZED, FOR SOLUTION INTRAVENOUS at 03:46

## 2021-07-25 RX ADMIN — MOXIFLOXACIN 1 DROP: 5 SOLUTION/ DROPS OPHTHALMIC at 11:10

## 2021-07-25 RX ADMIN — MOXIFLOXACIN 1 DROP: 5 SOLUTION/ DROPS OPHTHALMIC at 00:04

## 2021-07-25 RX ADMIN — MOXIFLOXACIN 1 DROP: 5 SOLUTION/ DROPS OPHTHALMIC at 19:20

## 2021-07-25 RX ADMIN — VITAMIN D, TAB 1000IU (100/BT) 1000 UNITS: 25 TAB at 18:20

## 2021-07-25 RX ADMIN — TOBRAMYCIN 1 DROP: 3 SOLUTION OPHTHALMIC at 22:10

## 2021-07-25 RX ADMIN — PIPERACILLIN SODIUM AND TAZOBACTAM SODIUM 4.5 G: 4; .5 INJECTION, POWDER, LYOPHILIZED, FOR SOLUTION INTRAVENOUS at 01:44

## 2021-07-25 RX ADMIN — VANCOMYCIN HYDROCHLORIDE 1 DROP: 1 INJECTION, POWDER, LYOPHILIZED, FOR SOLUTION INTRAVENOUS at 11:10

## 2021-07-25 RX ADMIN — TOBRAMYCIN 1 DROP: 3 SOLUTION OPHTHALMIC at 19:20

## 2021-07-25 RX ADMIN — TOBRAMYCIN 1 DROP: 3 SOLUTION OPHTHALMIC at 07:55

## 2021-07-25 RX ADMIN — VANCOMYCIN HYDROCHLORIDE 1 DROP: 1 INJECTION, POWDER, LYOPHILIZED, FOR SOLUTION INTRAVENOUS at 00:05

## 2021-07-25 RX ADMIN — TOBRAMYCIN 1 DROP: 3 SOLUTION OPHTHALMIC at 13:04

## 2021-07-25 RX ADMIN — TOBRAMYCIN 1 DROP: 3 SOLUTION OPHTHALMIC at 23:04

## 2021-07-25 RX ADMIN — VANCOMYCIN HYDROCHLORIDE 1 DROP: 1 INJECTION, POWDER, LYOPHILIZED, FOR SOLUTION INTRAVENOUS at 22:10

## 2021-07-25 RX ADMIN — TOBRAMYCIN 1 DROP: 3 SOLUTION OPHTHALMIC at 00:04

## 2021-07-25 RX ADMIN — TOBRAMYCIN 1 DROP: 3 SOLUTION OPHTHALMIC at 20:53

## 2021-07-25 RX ADMIN — MOXIFLOXACIN 1 DROP: 5 SOLUTION/ DROPS OPHTHALMIC at 10:10

## 2021-07-25 RX ADMIN — MOXIFLOXACIN 1 DROP: 5 SOLUTION/ DROPS OPHTHALMIC at 14:18

## 2021-07-25 RX ADMIN — Medication 5 ML: at 14:00

## 2021-07-25 RX ADMIN — VANCOMYCIN HYDROCHLORIDE 1 DROP: 1 INJECTION, POWDER, LYOPHILIZED, FOR SOLUTION INTRAVENOUS at 07:55

## 2021-07-25 RX ADMIN — TOBRAMYCIN 1 DROP: 3 SOLUTION OPHTHALMIC at 10:10

## 2021-07-25 RX ADMIN — MOXIFLOXACIN 1 DROP: 5 SOLUTION/ DROPS OPHTHALMIC at 13:04

## 2021-07-25 RX ADMIN — VANCOMYCIN HYDROCHLORIDE 1 DROP: 1 INJECTION, POWDER, LYOPHILIZED, FOR SOLUTION INTRAVENOUS at 10:24

## 2021-07-25 RX ADMIN — TOBRAMYCIN 1 DROP: 3 SOLUTION OPHTHALMIC at 03:46

## 2021-07-25 RX ADMIN — Medication 10 ML: at 06:08

## 2021-07-25 RX ADMIN — VANCOMYCIN HYDROCHLORIDE 1 DROP: 1 INJECTION, POWDER, LYOPHILIZED, FOR SOLUTION INTRAVENOUS at 14:18

## 2021-07-25 RX ADMIN — TOBRAMYCIN 1 DROP: 3 SOLUTION OPHTHALMIC at 01:53

## 2021-07-25 RX ADMIN — SODIUM CHLORIDE, SODIUM LACTATE, POTASSIUM CHLORIDE, AND CALCIUM CHLORIDE 75 ML/HR: 600; 310; 30; 20 INJECTION, SOLUTION INTRAVENOUS at 10:23

## 2021-07-25 RX ADMIN — PIPERACILLIN SODIUM AND TAZOBACTAM SODIUM 4.5 G: 4; .5 INJECTION, POWDER, LYOPHILIZED, FOR SOLUTION INTRAVENOUS at 09:30

## 2021-07-25 RX ADMIN — MOXIFLOXACIN 1 DROP: 5 SOLUTION/ DROPS OPHTHALMIC at 06:06

## 2021-07-25 RX ADMIN — TOBRAMYCIN 1 DROP: 3 SOLUTION OPHTHALMIC at 10:58

## 2021-07-25 RX ADMIN — TOBRAMYCIN 1 DROP: 3 SOLUTION OPHTHALMIC at 07:16

## 2021-07-25 RX ADMIN — Medication 10 ML: at 06:07

## 2021-07-25 RX ADMIN — TOBRAMYCIN 1 DROP: 3 SOLUTION OPHTHALMIC at 09:15

## 2021-07-25 RX ADMIN — MOXIFLOXACIN 1 DROP: 5 SOLUTION/ DROPS OPHTHALMIC at 20:05

## 2021-07-25 RX ADMIN — OXYCODONE 5 MG: 5 TABLET ORAL at 09:22

## 2021-07-25 RX ADMIN — VANCOMYCIN HYDROCHLORIDE 1 DROP: 1 INJECTION, POWDER, LYOPHILIZED, FOR SOLUTION INTRAVENOUS at 13:04

## 2021-07-25 RX ADMIN — MOXIFLOXACIN 1 DROP: 5 SOLUTION/ DROPS OPHTHALMIC at 22:10

## 2021-07-25 RX ADMIN — MOXIFLOXACIN 1 DROP: 5 SOLUTION/ DROPS OPHTHALMIC at 15:59

## 2021-07-25 RX ADMIN — VANCOMYCIN HYDROCHLORIDE 1 DROP: 1 INJECTION, POWDER, LYOPHILIZED, FOR SOLUTION INTRAVENOUS at 01:53

## 2021-07-25 RX ADMIN — MOXIFLOXACIN 1 DROP: 5 SOLUTION/ DROPS OPHTHALMIC at 09:15

## 2021-07-25 RX ADMIN — ACETAMINOPHEN 650 MG: 325 TABLET ORAL at 04:13

## 2021-07-25 RX ADMIN — TOBRAMYCIN 1 DROP: 3 SOLUTION OPHTHALMIC at 02:55

## 2021-07-25 RX ADMIN — MOXIFLOXACIN 1 DROP: 5 SOLUTION/ DROPS OPHTHALMIC at 10:58

## 2021-07-25 RX ADMIN — VANCOMYCIN HYDROCHLORIDE 1 DROP: 1 INJECTION, POWDER, LYOPHILIZED, FOR SOLUTION INTRAVENOUS at 18:19

## 2021-07-25 RX ADMIN — VANCOMYCIN HYDROCHLORIDE 1 DROP: 1 INJECTION, POWDER, LYOPHILIZED, FOR SOLUTION INTRAVENOUS at 09:16

## 2021-07-25 RX ADMIN — OXYCODONE 5 MG: 5 TABLET ORAL at 02:17

## 2021-07-25 RX ADMIN — TOBRAMYCIN 1 DROP: 3 SOLUTION OPHTHALMIC at 05:10

## 2021-07-25 RX ADMIN — TOBRAMYCIN 1 DROP: 3 SOLUTION OPHTHALMIC at 15:09

## 2021-07-25 RX ADMIN — ENOXAPARIN SODIUM 40 MG: 40 INJECTION SUBCUTANEOUS at 09:04

## 2021-07-25 RX ADMIN — MOXIFLOXACIN 1 DROP: 5 SOLUTION/ DROPS OPHTHALMIC at 16:52

## 2021-07-25 RX ADMIN — VANCOMYCIN HYDROCHLORIDE 1 DROP: 1 INJECTION, POWDER, LYOPHILIZED, FOR SOLUTION INTRAVENOUS at 10:58

## 2021-07-25 RX ADMIN — ACETAMINOPHEN 650 MG: 325 TABLET ORAL at 20:49

## 2021-07-25 RX ADMIN — VANCOMYCIN HYDROCHLORIDE 1 DROP: 1 INJECTION, POWDER, LYOPHILIZED, FOR SOLUTION INTRAVENOUS at 19:20

## 2021-07-25 RX ADMIN — VANCOMYCIN HYDROCHLORIDE 1 DROP: 1 INJECTION, POWDER, LYOPHILIZED, FOR SOLUTION INTRAVENOUS at 20:53

## 2021-07-25 RX ADMIN — MOXIFLOXACIN 1 DROP: 5 SOLUTION/ DROPS OPHTHALMIC at 01:53

## 2021-07-25 RX ADMIN — VANCOMYCIN HYDROCHLORIDE 1000 MG: 1 INJECTION, POWDER, LYOPHILIZED, FOR SOLUTION INTRAVENOUS at 20:40

## 2021-07-25 RX ADMIN — TOBRAMYCIN 1 DROP: 3 SOLUTION OPHTHALMIC at 06:06

## 2021-07-25 RX ADMIN — VANCOMYCIN HYDROCHLORIDE 1 DROP: 1 INJECTION, POWDER, LYOPHILIZED, FOR SOLUTION INTRAVENOUS at 15:58

## 2021-07-25 RX ADMIN — FUROSEMIDE 40 MG: 40 TABLET ORAL at 09:04

## 2021-07-25 NOTE — CONSULTS
OPHTHALMOLOGY CONSULT    Date and Time: 7/25/2021  10:00 AM    Admission Date and Time: 7/24/2021  3:18 PM    Referring Physician: Jamaal Buckner MD    Reason For Consult: Infectious keratitis and scleritis OD    History of Present Illness: The patient is a 59 y.o. male with PMH significant for adenocarcinoma of lung on chemotherapy and myocardial infarction who was sent to the hospital after failing outpatient therapy for a corneal ulcer OD. In brief, vegetative matter hit the patient's right eye while he was weed eating last week. He was seen at the [facility] ED and discharged with recommended urgent follow up at the [address] [place] [address]. This appointment took place on 7/19, and he was immediately referred to me with a [id number] corneal infiltrate. Culture was obtained, which showed pansensitive Pseudomonas. In spite of appropriate treatment starting 7/19, the infection spread to the sclera. He was sent to [facility] for IV antibiotics and potential surgical intervention pending clinical course.     Eye History: Pseudophakia OD, cataract OS    ================================================================                PHYSICAL EXAM  ================================================================  Visit Vitals  BP (!) 144/79 (BP 1 Location: Left upper arm, BP Patient Position: At rest;Sitting)   Pulse 89   Temp 98 °F (36.7 °C)   Resp 20   Ht 5' 6\" (1.676 m)   Wt 72 kg (158 lb 12.8 oz)   SpO2 97%   BMI 25.63 kg/m²     Visual Acuity: LP OD    Pupils:   OD: No view, no RAPD by reverse   OS: 4 mm to 2 mm with light, 2+ reactive, no RAPD    Intraocular Pressure: Deferred  ________________________________________________________________________    Anterior Exam:    External Exam:   OD: Mild UL ptosis, mild mucopurulence on lashes   OS: Normal    Conjunctiva:   OD: Conjunctival epi defect overlying scleritis, 4+ injection, moderate edema   OS: Clear    Sclera:   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 Pansensitive Pseudomonas keratitis and scleritis OD - Area of corneal thinning superiorly, Misbah negative. Eye not soft on light palpation of sclera. Scleritis stable. Patient understands poor overall prognosis for the eye. Will delay any surgical intervention for another day. Recommendations as follows:  - Fortified Tobramycin 15 mg/mL Q1H OD  - Moxifloxacin Q1H OD  - Fortified Vancomycin 50 mg/mL QID OD  - IV Zosyn per hospitalist service  - Doxycycline 100 mg PO BID  - Vitamin C 1,000 mg PO BID  - NPO after midnight    Thank you for including us in the patient's care. Please call with any further questions or concerns.     Lamar Costa MD  [facility]

## 2021-07-25 NOTE — ROUTINE PROCESS
Verbal bedside report received from Palomo Garrett, [facility]. Assumed care of patient. Pt not on floor currently.

## 2021-07-25 NOTE — PROGRESS NOTES
Pharmacokinetic Consult to Pharmacist    Autumn Christiano is a 59 y.o. male being treated for head/neck/eye infection with vancomycin. Height: 5' 6\" (167.6 cm)  Weight: 72 kg (158 lb 12.8 oz)  Lab Results   Component Value Date/Time    BUN 17 07/24/2021 02:52 PM    Creatinine 0.88 07/24/2021 02:52 PM    WBC 4.8 07/24/2021 02:52 PM    Procalcitonin <0.05 07/24/2021 02:52 PM    Lactic acid 0.7 07/24/2021 02:52 PM      Estimated Creatinine Clearance: 77.5 mL/min (based on SCr of 0.88 mg/dL). CULTURES:  7/24 blood - pending    No results found for: Miguel Gudino    Day 1 of vancomycin. Goal trough is 15-20. Will treat with 1000 mg q12h after 1750mg load. Will continue to follow patient and order levels when clinically indicated.       Thank you,  Mono Fraser, PharmD

## 2021-07-25 NOTE — PROGRESS NOTES
Ar Hospitalist Note     Admit Date:  2021  3:18 PM   Name:  Lolis Sanches   Age:  59 y.o.  :  1960   MRN:  [id number]   PCP:  None  Treatment Team: Attending Provider: Al Apodaca MD; Consulting Provider: Josef Stratton MD; Primary Nurse: Paulina Toro Primary Nurse: Maria Fernanda Mathews, RN; Primary Nurse: Osbaldo Reyes, RN; Care Manager: Mel Song LMSW    HPI/Subjective:     Patient is a 49-year-old man with past medical history significant for CAD, adenocarcinoma of lung, on chemotherapy presented to the ED referred by ophthalmology for failed outpatient therapy for eye infection. Patient reports he was working in his yard on  when a piece of grass flew into his right eye. He flushed his eye with warm tap water but next day he woke up with blurred vision, painful red eye. He was seen at [facility] ED and was discharged home on amoxicillin and some antibiotic ointment. Patient reports symptoms persistent and he was seen by ophthalmology on  and was placed on oral Cipro, doxycycline and tobramycin ophthalmic drops every hour, moxifloxacin ophthalmic drops every hour and vancomycin eyedrops. Patient reports symptom has had worsened significantly with persistent discharge, and no vision in RT eye. He was evaluated by ophthalmology who tried to admit patient directly but no bed was available. Patient was sent to the ER for IV antibiotics and Dr. Sriram Cox of [facility] will see patient in the morning. Patient denies any other symptoms. Denies fever, chills, nausea, vomiting, abdominal pain, shortness of breath or chest pain. In the ED patient was vitally stable. Labs unremarkable. Hospitalist consulted for admission. 2021: As per patient his pain is better. Still has pain in his right eye with discharge. Denies any nausea, vomiting, chest pain or palpitations. Rest of the system negative except mentioned above.     Assessment and Plan:     Right corneal ulcer/hypopyon:  Cultures from [facility] showed pansensitive Pseudomonas  Start patient on vancomycin/Zosyn, pharmacy consulted to adjust dose  Dr. Kolby Sadler of [facility] eye consulted  ED spoke to Dr. Kolby Sadler who recommend patient be n.p.o. after midnight for possible surgical debridement tomorrow  Continue tobramycin and moxifloxacin eyedrops  Continue vitamin D 1000 mg twice daily    July 25: Still significant infection. On Zosyn 4.5 g to cover Pseudomonas. Also on vancomycin for now. On eyedrops to cover Pseudomonas as well as other infection. Dr. Kolby Sadler will see patient today as patient has already talked to him. Patient is n.p.o. Slow IV fluid hydration  Pain control with oxycodone. CHF/CAD:  Continue Lasix,and spironolactone  Continue aspirin. Holding Plavix given possible surgery. Resumption of Plavix per ophthalmologist recommendation. Adenocarcinoma of lung, on chemotherapy:  Outpatient oncology follow-up    Discharge planning: Admit patient to the medical floor    DVT ppx ordered  Code status:  Full  Estimated LOS:  Greater than 2 midnights    Patient is AOx3. He wants his son Rosemary Ozuna to be power of  and he wants to be full code. Discussed with the patient and he would like to update family by himself. I encouraged him to asked the nurse to page me if he or his family has any questions.         Hospital Problems as of 7/25/2021 Never Reviewed        Codes Class Noted - Resolved POA    * (Principal) Corneal ulcer, right ICD-10-CM: H16.001  ICD-9-CM: 370.00  7/24/2021 - Present Unknown        Adenocarcinoma of lung ([facility] [address] ICD-10-CM: C34.90  ICD-9-CM: 162.9  7/24/2021 - Present Unknown        On antineoplastic chemotherapy ICD-10-CM: Z79.899  ICD-9-CM: V58.69  7/24/2021 - Present Unknown                    Objective:     Patient Vitals for the past 24 hrs:   Temp Pulse Resp BP SpO2   07/25/21 0904  83  138/76    07/25/21 0830 98.1 °F (36.7 °C) 77 16 138/76 98 %   07/25/21 0400 97.7 °F (36.5 °C) 84 18 (!) 167/88 98 %   07/25/21 0000 98 °F (36.7 °C) 87 18 135/76 98 %   07/24/21 1850 98.8 °F (37.1 °C) 87 18 (!) 152/79 97 %   07/24/21 1644  81   97 %   07/24/21 1555  91  132/85 98 %   07/24/21 1445 98.1 °F (36.7 °C) 87 16 126/78 97 %     Oxygen Therapy  O2 Sat (%): 98 % (07/25/21 0830)  Pulse via Oximetry: 82 beats per minute (07/24/21 1644)  O2 Device: None (Room air) (07/25/21 0737)    Estimated body mass index is 25.63 kg/m² as calculated from the following:    Height as of this encounter: 5' 6\" (1.676 m). Weight as of this encounter: 72 kg (158 lb 12.8 oz). Intake/Output Summary (Last 24 hours) at 7/25/2021 0925  Last data filed at 7/25/2021 0737  Gross per 24 hour   Intake 1000 ml   Output 650 ml   Net 350 ml       *Note that automatically entered I/Os may not be accurate; dependent on patient compliance with collection and accurate  by assistants. Physical Exam:  General:    Well nourished. Alert. Eyes:   Right eye with large amount of discharge to the cornea, visible hypopyon and swollen injected sclera  HENT:  Normocephalic, mucous membranes slightly dry  CV:   RRR. No m/r/g. Lungs:  CTAB. No wheezing, rhonchi, or rales. Abdomen: Soft, nontender, nondistended. Extremities: Warm and dry. No cyanosis or edema. Neurologic: AOx3, moving all 4 extremities, speech normal.  Cannot see from right eye at all  Skin:     No rashes or jaundice. Normal coloration  Psych:  Normal mood and affect. I reviewed the labs, imaging, EKGs, telemetry, and other studies done this admission.   Data Reviewed:     Procedures done this admission:  * No surgery found *    All Micro Results     Procedure Component Value Units Date/Time    BLOOD CULTURE [[id number]] Collected: 07/24/21 1530    Order Status: Completed Specimen: Blood Updated: 07/24/21 1737    BLOOD CULTURE [[id number]] Collected: 07/24/21 1452    Order Status: Completed Specimen: Blood Updated: 07/24/21 1509          SARS-CoV-2 Lab Results  \"Novel Coronavirus\" Test: No results found for: COV2NT   \"Emergent Disease\" Test: No results found for: EDPR  \"SARS-COV-2\" Test: No results found for: XGCOVT  \"Precision Labs\" Test: No results found for: RSLT  Rapid Test: No results found for: COVR         Labs: Results:       BMP, Mg, Phos Recent Labs     07/25/21  0402 07/24/21  1452    137   K 4.0 3.9    101   CO2 31 32   AGAP 2* 4*   BUN 14 17   CREA 0.71* 0.88   CA 9.0 8.9   GLU 98 117*      CBC Recent Labs     07/25/21  0402 07/24/21  1452   WBC 5.8 4.8   RBC 4.19* 3.91*   HGB 12.5* 11.6*   HCT 39.4* 36.1*    249   GRANS 57 52   LYMPH 24 28   EOS 1 2   MONOS 12 14*   BASOS 1 0   IG 5 4   ANEU 3.3 2.5   ABL 1.4 1.3   CHACE 0.1 0.1   ABM 0.7 0.7   ABB 0.0 0.0   AIG 0.3 0.2      LFT Recent Labs     07/24/21  1452   ALT 32      TP 7.3   ALB 3.7   GLOB 3.6*   AGRAT 1.0*      Cardiac Testing No results found for: BNPP, BNP, CPK, RCK1, RCK2, RCK3, RCK4, CKMB, CKNDX, CKND1, TROPT, TROIQ   Coagulation Tests No results found for: PTP, INR, APTT, INREXT   A1c No results found for: HBA1C, [id number]   Lipid Panel No results found for: CHOL, CHOLPOCT, CHOLX, CHLST, CHOLV, 020086, HDL, HDLP, LDL, LDLC, DLDLP, 164996, VLDLC, VLDL, TGLX, TRIGL, TRIGP, TGLPOCT, CHHD, CHHDX   Thyroid Panel No results found for: TSH, T4, FT4, TT3, T3U, TSHEXT     Most Recent UA No results found for: COLOR, APPRN, REFSG, TREV, PROTU, GLUCU, KETU, BILU, BLDU, UROU, MEGHA, LEUKU, WBCU, RBCU, UEPI, BACTU, CASTS, UCRY, MUCUS, UCOM         Current Facility-Administered Medications   Medication Dose Route Frequency    oxyCODONE IR (ROXICODONE) tablet 5 mg  5 mg Oral Q4H PRN    sodium chloride (NS) flush 5-10 mL  5-10 mL IntraVENous Q8H    sodium chloride (NS) flush 5-10 mL  5-10 mL IntraVENous PRN    sodium chloride (NS) flush 5-40 mL  5-40 mL IntraVENous Q8H    sodium chloride (NS) flush 5-40 mL  5-40 mL IntraVENous PRN    acetaminophen (TYLENOL) tablet 650 mg 650 mg Oral Q6H PRN    Or    acetaminophen (TYLENOL) suppository 650 mg  650 mg Rectal Q6H PRN    polyethylene glycol (MIRALAX) packet 17 g  17 g Oral DAILY PRN    ondansetron (ZOFRAN ODT) tablet 4 mg  4 mg Oral Q8H PRN    Or    ondansetron (ZOFRAN) injection 4 mg  4 mg IntraVENous Q6H PRN    enoxaparin (LOVENOX) injection 40 mg  40 mg SubCUTAneous DAILY    piperacillin-tazobactam (ZOSYN) 4.5 g in 0.9% sodium chloride (MBP/ADV) 100 mL MBP  4.5 g IntraVENous Q8H    spironolactone (ALDACTONE) tablet 25 mg  25 mg Oral DAILY    furosemide (LASIX) tablet 40 mg  40 mg Oral DAILY    cholecalciferol (VITAMIN D3) (1000 Units /25 mcg) tablet 1,000 Units  1,000 Units Oral BID    fortified vancomycin 50 mg/ml eye drops (Patient Supplied)  1 Drop Right Eye Q1H    fortified tobramycin (NEBCIN) 15 mg/ml eye drops (Patient Supplied)  1 Drop Ophthalmic Q1H    moxifloxacin (VIGAMOX) 0.5 % ophthalmic solution 1 Drop   1 Drop Right Eye Q1H    vancomycin (VANCOCIN) 1,000 mg in 0.9% sodium chloride 250 mL (VIAL-MATE)  1,000 mg IntraVENous Q12H       Other Studies:  No results found for this visit on 07/24/21. XR CHEST PORT    Result Date: 7/24/2021  History: Infection Exam: portable chest Comparison: None Findings: A port overlies the right chest wall. No focal alveolar infiltrate. Median sternotomy wires present. No pneumothorax. IMPRESSIONs: No acute findings. SARS-CoV-2 Lab Results  \"Novel Coronavirus\" Test: No results found for: COV2NT   \"Emergent Disease\" Test: No results found for: EDPR  \"SARS-COV-2\" Test: No results found for: XGCOVT  Rapid Test: No results found for: COVR            Part of this note was written by using a voice dictation software and the note has been proof read but may still contain some grammatical/other typographical errors.     Signed:  Dora Donald MD

## 2021-07-25 NOTE — PROGRESS NOTES
Dual skin assessment completed on admission       07/24/21 1945   Skin Integumentary   Skin Integumentary (WDL) WDL    Pressure  Injury Documentation No Pressure Injury Noted-Pressure Ulcer Prevention Initiated   Skin Color Appropriate for ethnicity   Skin Condition/Temp Dry; Warm   Skin Integrity Intact   Turgor Non-tenting   Hair Growth Present   Nails X   Varicosities Absent   Exceptions to WDL Thick     R eye swollen, redden, and drainage. R port visualized not accessed. CABG scars and scattered scars visualized. Heels flaky. Sacrum intact.

## 2021-07-25 NOTE — ROUTINE PROCESS
Verbal bedside report given to [facility] davis GUAMAN RN. Patient's situation, background, assessment and recommendations provided. Opportunity for questions provided. Oncoming RN assumed care of patient.

## 2021-07-25 NOTE — PROGRESS NOTES
Pt ran out of home supplied moxifloxacin eye gtt. Notified Roberta Brown in pharmacy. Will attempt to obtain for future doses. 0600- Obtained new bottles from pharmacy.

## 2021-07-25 NOTE — ROUTINE PROCESS
Verbal bedside report given to davis Redd RN. Patient's situation, background, assessment and recommendations provided. Opportunity for questions provided. Oncoming RN assumed care of patient.

## 2021-07-26 LAB
CREAT SERPL-MCNC: 0.7 MG/DL (ref 0.8–1.5)
GLUCOSE BLD STRIP.AUTO-MCNC: 110 MG/DL (ref 65–100)
GLUCOSE BLD STRIP.AUTO-MCNC: 111 MG/DL (ref 65–100)
GLUCOSE BLD STRIP.AUTO-MCNC: 113 MG/DL (ref 65–100)
SERVICE CMNT-IMP: ABNORMAL
VANCOMYCIN TROUGH SERPL-MCNC: 12.2 UG/ML (ref 5–20)

## 2021-07-26 PROCEDURE — 74011250637 HC RX REV CODE- 250/637: Performed by: INTERNAL MEDICINE

## 2021-07-26 PROCEDURE — 74011000258 HC RX REV CODE- 258: Performed by: FAMILY MEDICINE

## 2021-07-26 PROCEDURE — 2709999900 HC NON-CHARGEABLE SUPPLY

## 2021-07-26 PROCEDURE — 82565 ASSAY OF CREATININE: CPT

## 2021-07-26 PROCEDURE — 74011000258 HC RX REV CODE- 258: Performed by: INTERNAL MEDICINE

## 2021-07-26 PROCEDURE — 74011250636 HC RX REV CODE- 250/636: Performed by: FAMILY MEDICINE

## 2021-07-26 PROCEDURE — 74011250637 HC RX REV CODE- 250/637: Performed by: FAMILY MEDICINE

## 2021-07-26 PROCEDURE — 74011250636 HC RX REV CODE- 250/636: Performed by: INTERNAL MEDICINE

## 2021-07-26 PROCEDURE — 80202 ASSAY OF VANCOMYCIN: CPT

## 2021-07-26 PROCEDURE — 36415 COLL VENOUS BLD VENIPUNCTURE: CPT

## 2021-07-26 PROCEDURE — 82962 GLUCOSE BLOOD TEST: CPT

## 2021-07-26 PROCEDURE — 65270000029 HC RM PRIVATE

## 2021-07-26 RX ORDER — ASCORBIC ACID 500 MG
1000 TABLET ORAL 2 TIMES DAILY
Status: DISCONTINUED | OUTPATIENT
Start: 2021-07-26 | End: 2021-08-01 | Stop reason: HOSPADM

## 2021-07-26 RX ORDER — DOXYCYCLINE 100 MG/1
100 CAPSULE ORAL EVERY 12 HOURS
Status: DISCONTINUED | OUTPATIENT
Start: 2021-07-26 | End: 2021-07-26

## 2021-07-26 RX ADMIN — VANCOMYCIN HYDROCHLORIDE 1 DROP: 1 INJECTION, POWDER, LYOPHILIZED, FOR SOLUTION INTRAVENOUS at 07:47

## 2021-07-26 RX ADMIN — SPIRONOLACTONE 25 MG: 25 TABLET ORAL at 09:31

## 2021-07-26 RX ADMIN — TOBRAMYCIN 1 DROP: 3 SOLUTION OPHTHALMIC at 09:38

## 2021-07-26 RX ADMIN — TOBRAMYCIN 1 DROP: 3 SOLUTION OPHTHALMIC at 17:33

## 2021-07-26 RX ADMIN — MOXIFLOXACIN 1 DROP: 5 SOLUTION/ DROPS OPHTHALMIC at 20:47

## 2021-07-26 RX ADMIN — ACETAMINOPHEN 650 MG: 325 TABLET ORAL at 11:14

## 2021-07-26 RX ADMIN — VANCOMYCIN HYDROCHLORIDE 1000 MG: 1 INJECTION, POWDER, LYOPHILIZED, FOR SOLUTION INTRAVENOUS at 09:31

## 2021-07-26 RX ADMIN — MOXIFLOXACIN 1 DROP: 5 SOLUTION/ DROPS OPHTHALMIC at 01:30

## 2021-07-26 RX ADMIN — TOBRAMYCIN 1 DROP: 3 SOLUTION OPHTHALMIC at 00:15

## 2021-07-26 RX ADMIN — TOBRAMYCIN 1 DROP: 3 SOLUTION OPHTHALMIC at 10:48

## 2021-07-26 RX ADMIN — TOBRAMYCIN 1 DROP: 3 SOLUTION OPHTHALMIC at 06:12

## 2021-07-26 RX ADMIN — MOXIFLOXACIN 1 DROP: 5 SOLUTION/ DROPS OPHTHALMIC at 14:38

## 2021-07-26 RX ADMIN — OXYCODONE 5 MG: 5 TABLET ORAL at 03:15

## 2021-07-26 RX ADMIN — PIPERACILLIN SODIUM AND TAZOBACTAM SODIUM 4.5 G: 4; .5 INJECTION, POWDER, LYOPHILIZED, FOR SOLUTION INTRAVENOUS at 03:16

## 2021-07-26 RX ADMIN — VITAMIN D, TAB 1000IU (100/BT) 1000 UNITS: 25 TAB at 09:30

## 2021-07-26 RX ADMIN — MOXIFLOXACIN 1 DROP: 5 SOLUTION/ DROPS OPHTHALMIC at 18:10

## 2021-07-26 RX ADMIN — VANCOMYCIN HYDROCHLORIDE 1 DROP: 1 INJECTION, POWDER, LYOPHILIZED, FOR SOLUTION INTRAVENOUS at 19:50

## 2021-07-26 RX ADMIN — VANCOMYCIN HYDROCHLORIDE 1 DROP: 1 INJECTION, POWDER, LYOPHILIZED, FOR SOLUTION INTRAVENOUS at 00:15

## 2021-07-26 RX ADMIN — MOXIFLOXACIN 1 DROP: 5 SOLUTION/ DROPS OPHTHALMIC at 00:15

## 2021-07-26 RX ADMIN — VANCOMYCIN HYDROCHLORIDE 1 DROP: 1 INJECTION, POWDER, LYOPHILIZED, FOR SOLUTION INTRAVENOUS at 10:50

## 2021-07-26 RX ADMIN — VANCOMYCIN HYDROCHLORIDE 1 DROP: 1 INJECTION, POWDER, LYOPHILIZED, FOR SOLUTION INTRAVENOUS at 12:29

## 2021-07-26 RX ADMIN — VANCOMYCIN HYDROCHLORIDE 1 DROP: 1 INJECTION, POWDER, LYOPHILIZED, FOR SOLUTION INTRAVENOUS at 13:36

## 2021-07-26 RX ADMIN — MOXIFLOXACIN 1 DROP: 5 SOLUTION/ DROPS OPHTHALMIC at 16:30

## 2021-07-26 RX ADMIN — VANCOMYCIN HYDROCHLORIDE 1 DROP: 1 INJECTION, POWDER, LYOPHILIZED, FOR SOLUTION INTRAVENOUS at 06:12

## 2021-07-26 RX ADMIN — VANCOMYCIN HYDROCHLORIDE 1 DROP: 1 INJECTION, POWDER, LYOPHILIZED, FOR SOLUTION INTRAVENOUS at 20:47

## 2021-07-26 RX ADMIN — VANCOMYCIN HYDROCHLORIDE 1 DROP: 1 INJECTION, POWDER, LYOPHILIZED, FOR SOLUTION INTRAVENOUS at 16:30

## 2021-07-26 RX ADMIN — TOBRAMYCIN 1 DROP: 3 SOLUTION OPHTHALMIC at 23:40

## 2021-07-26 RX ADMIN — VANCOMYCIN HYDROCHLORIDE 1 DROP: 1 INJECTION, POWDER, LYOPHILIZED, FOR SOLUTION INTRAVENOUS at 18:10

## 2021-07-26 RX ADMIN — MOXIFLOXACIN 1 DROP: 5 SOLUTION/ DROPS OPHTHALMIC at 06:12

## 2021-07-26 RX ADMIN — MOXIFLOXACIN 1 DROP: 5 SOLUTION/ DROPS OPHTHALMIC at 23:40

## 2021-07-26 RX ADMIN — VANCOMYCIN HYDROCHLORIDE 1 DROP: 1 INJECTION, POWDER, LYOPHILIZED, FOR SOLUTION INTRAVENOUS at 15:12

## 2021-07-26 RX ADMIN — VANCOMYCIN HYDROCHLORIDE 1 DROP: 1 INJECTION, POWDER, LYOPHILIZED, FOR SOLUTION INTRAVENOUS at 21:53

## 2021-07-26 RX ADMIN — VANCOMYCIN HYDROCHLORIDE 1 DROP: 1 INJECTION, POWDER, LYOPHILIZED, FOR SOLUTION INTRAVENOUS at 23:40

## 2021-07-26 RX ADMIN — PIPERACILLIN SODIUM AND TAZOBACTAM SODIUM 4.5 G: 4; .5 INJECTION, POWDER, LYOPHILIZED, FOR SOLUTION INTRAVENOUS at 17:31

## 2021-07-26 RX ADMIN — Medication 1000 MG: at 17:00

## 2021-07-26 RX ADMIN — DOXYCYCLINE 100 MG: 100 INJECTION, POWDER, LYOPHILIZED, FOR SOLUTION INTRAVENOUS at 07:58

## 2021-07-26 RX ADMIN — TOBRAMYCIN 1 DROP: 3 SOLUTION OPHTHALMIC at 07:48

## 2021-07-26 RX ADMIN — MOXIFLOXACIN 1 DROP: 5 SOLUTION/ DROPS OPHTHALMIC at 13:37

## 2021-07-26 RX ADMIN — VANCOMYCIN HYDROCHLORIDE 1 DROP: 1 INJECTION, POWDER, LYOPHILIZED, FOR SOLUTION INTRAVENOUS at 04:00

## 2021-07-26 RX ADMIN — TOBRAMYCIN 1 DROP: 3 SOLUTION OPHTHALMIC at 04:00

## 2021-07-26 RX ADMIN — MOXIFLOXACIN 1 DROP: 5 SOLUTION/ DROPS OPHTHALMIC at 21:53

## 2021-07-26 RX ADMIN — Medication 10 ML: at 06:14

## 2021-07-26 RX ADMIN — MOXIFLOXACIN 1 DROP: 5 SOLUTION/ DROPS OPHTHALMIC at 15:12

## 2021-07-26 RX ADMIN — PIPERACILLIN SODIUM AND TAZOBACTAM SODIUM 4.5 G: 4; .5 INJECTION, POWDER, LYOPHILIZED, FOR SOLUTION INTRAVENOUS at 10:49

## 2021-07-26 RX ADMIN — DOXYCYCLINE 100 MG: 100 INJECTION, POWDER, LYOPHILIZED, FOR SOLUTION INTRAVENOUS at 20:39

## 2021-07-26 RX ADMIN — MOXIFLOXACIN 1 DROP: 5 SOLUTION/ DROPS OPHTHALMIC at 19:50

## 2021-07-26 RX ADMIN — ACETAMINOPHEN 650 MG: 325 TABLET ORAL at 04:57

## 2021-07-26 RX ADMIN — ACETAMINOPHEN 650 MG: 325 TABLET ORAL at 17:30

## 2021-07-26 RX ADMIN — VANCOMYCIN HYDROCHLORIDE 1 DROP: 1 INJECTION, POWDER, LYOPHILIZED, FOR SOLUTION INTRAVENOUS at 01:30

## 2021-07-26 RX ADMIN — TOBRAMYCIN 1 DROP: 3 SOLUTION OPHTHALMIC at 19:51

## 2021-07-26 RX ADMIN — MOXIFLOXACIN 1 DROP: 5 SOLUTION/ DROPS OPHTHALMIC at 04:51

## 2021-07-26 RX ADMIN — Medication 5 ML: at 14:40

## 2021-07-26 RX ADMIN — ENOXAPARIN SODIUM 40 MG: 40 INJECTION SUBCUTANEOUS at 09:31

## 2021-07-26 RX ADMIN — TOBRAMYCIN 1 DROP: 3 SOLUTION OPHTHALMIC at 18:10

## 2021-07-26 RX ADMIN — MOXIFLOXACIN 1 DROP: 5 SOLUTION/ DROPS OPHTHALMIC at 17:32

## 2021-07-26 RX ADMIN — FUROSEMIDE 40 MG: 40 TABLET ORAL at 09:42

## 2021-07-26 RX ADMIN — TOBRAMYCIN 1 DROP: 3 SOLUTION OPHTHALMIC at 14:38

## 2021-07-26 RX ADMIN — TOBRAMYCIN 1 DROP: 3 SOLUTION OPHTHALMIC at 12:27

## 2021-07-26 RX ADMIN — VITAMIN D, TAB 1000IU (100/BT) 1000 UNITS: 25 TAB at 17:31

## 2021-07-26 RX ADMIN — MOXIFLOXACIN 1 DROP: 5 SOLUTION/ DROPS OPHTHALMIC at 03:24

## 2021-07-26 RX ADMIN — VANCOMYCIN HYDROCHLORIDE 1 DROP: 1 INJECTION, POWDER, LYOPHILIZED, FOR SOLUTION INTRAVENOUS at 17:33

## 2021-07-26 RX ADMIN — TOBRAMYCIN 1 DROP: 3 SOLUTION OPHTHALMIC at 21:52

## 2021-07-26 RX ADMIN — VANCOMYCIN HYDROCHLORIDE 1 DROP: 1 INJECTION, POWDER, LYOPHILIZED, FOR SOLUTION INTRAVENOUS at 09:34

## 2021-07-26 RX ADMIN — TOBRAMYCIN 1 DROP: 3 SOLUTION OPHTHALMIC at 15:13

## 2021-07-26 RX ADMIN — TOBRAMYCIN 1 DROP: 3 SOLUTION OPHTHALMIC at 13:38

## 2021-07-26 RX ADMIN — TOBRAMYCIN 1 DROP: 3 SOLUTION OPHTHALMIC at 20:46

## 2021-07-26 RX ADMIN — VANCOMYCIN HYDROCHLORIDE 1 DROP: 1 INJECTION, POWDER, LYOPHILIZED, FOR SOLUTION INTRAVENOUS at 03:24

## 2021-07-26 RX ADMIN — ASPIRIN 81 MG: 81 TABLET ORAL at 09:31

## 2021-07-26 RX ADMIN — MOXIFLOXACIN 1 DROP: 5 SOLUTION/ DROPS OPHTHALMIC at 09:37

## 2021-07-26 RX ADMIN — TOBRAMYCIN 1 DROP: 3 SOLUTION OPHTHALMIC at 01:30

## 2021-07-26 RX ADMIN — TOBRAMYCIN 1 DROP: 3 SOLUTION OPHTHALMIC at 16:29

## 2021-07-26 RX ADMIN — MOXIFLOXACIN 1 DROP: 5 SOLUTION/ DROPS OPHTHALMIC at 10:46

## 2021-07-26 RX ADMIN — TOBRAMYCIN 1 DROP: 3 SOLUTION OPHTHALMIC at 03:24

## 2021-07-26 RX ADMIN — MOXIFLOXACIN 1 DROP: 5 SOLUTION/ DROPS OPHTHALMIC at 07:47

## 2021-07-26 RX ADMIN — Medication 1000 MG: at 09:31

## 2021-07-26 RX ADMIN — VANCOMYCIN HYDROCHLORIDE 1 DROP: 1 INJECTION, POWDER, LYOPHILIZED, FOR SOLUTION INTRAVENOUS at 14:39

## 2021-07-26 RX ADMIN — ACETAMINOPHEN 650 MG: 325 TABLET ORAL at 23:45

## 2021-07-26 RX ADMIN — MOXIFLOXACIN 1 DROP: 5 SOLUTION/ DROPS OPHTHALMIC at 12:28

## 2021-07-26 NOTE — ROUTINE PROCESS
Verbal bedside report [facility]. Patient's situation, background, assessment and recommendations provided. Opportunity for questions provided. Oncoming RN assumed care of patient.

## 2021-07-26 NOTE — ROUTINE PROCESS
Bedside and Verbal shift change report given to self (oncoming nurse) by Hal Treviño RN (offgoing nurse). Report included the following information SBAR, Kardex, Procedure Summary, Intake/Output, MAR and Recent Results.

## 2021-07-26 NOTE — PROGRESS NOTES
Ar Hospitalist Note     Admit Date:  2021  3:18 PM   Name:  Mal Youngblood   Age:  59 y.o.  :  1960   MRN:  [id number]   PCP:  None  Treatment Team: Attending Provider: Elaina Severance, MD; Consulting Provider: Will Butterfield MD; Primary Nurse: Noah Guerrero Primary Nurse: Katarzyna العلي RN; Care Manager: India Tripp LMSW; Utilization Review: Gabriele Small RN    HPI/Subjective:     Patient is a 78-year-old man with past medical history significant for CAD, adenocarcinoma of lung, on chemotherapy presented to the ED referred by ophthalmology for failed outpatient therapy for eye infection. Patient reports he was working in his yard on  when a piece of grass flew into his right eye. He flushed his eye with warm tap water but next day he woke up with blurred vision, painful red eye. He was seen at [facility] ED and was discharged home on amoxicillin and some antibiotic ointment. Patient reports symptoms persistent and he was seen by ophthalmology on  and was placed on oral Cipro, doxycycline and tobramycin ophthalmic drops every hour, moxifloxacin ophthalmic drops every hour and vancomycin eyedrops. Patient reports symptom has had worsened significantly with persistent discharge, and no vision in RT eye. He was evaluated by ophthalmology who tried to admit patient directly but no bed was available. Patient was sent to the ER for IV antibiotics and Dr. Seema Correa of [facility] will see patient in the morning. Patient denies any other symptoms. Denies fever, chills, nausea, vomiting, abdominal pain, shortness of breath or chest pain. In the ED patient was vitally stable. Labs unremarkable. Hospitalist consulted for admission. 2021: As per patient his pain is better. Still has pain in his right eye with discharge. Denies any nausea, vomiting, chest pain or palpitations. : As per patient is feeling same.   Waiting for ophthalmologist to evaluate. Rest of the system negative except mentioned above. Assessment and Plan:     Right corneal ulcer/hypopyon:  Cultures from [facility] showed pansensitive Pseudomonas  Start patient on vancomycin/Zosyn, pharmacy consulted to adjust dose  Dr. Dann Abarca of [facility] eye consulted  ED spoke to Dr. Dann Abarca who recommend patient be n.p.o. after midnight for possible surgical debridement tomorrow  Continue tobramycin and moxifloxacin eyedrops  Continue vitamin D 1000 mg twice daily    July 25: Still significant infection. On Zosyn 4.5 g to cover Pseudomonas. Also on vancomycin for now. On eyedrops to cover Pseudomonas as well as other infection. Dr. Dann Abarca will see patient today as patient has already talked to him. Patient is n.p.o. Slow IV fluid hydration  Pain control with oxycodone. July 26: Doxycycline added to regimen as recommended by ophthalmologist.  Blood cultures negative and per ophthalmology note, his culture as outpatient showed Pseudomonas so we will discontinue vancomycin. Surgical intervention per ophthalmology. CHF/CAD:  Continue Lasix,and spironolactone  Continue aspirin. Holding Plavix given possible surgery. Resumption of Plavix per ophthalmologist recommendation. Adenocarcinoma of lung, on chemotherapy:  Outpatient oncology follow-up    Discharge planning: Admit patient to the medical floor    DVT ppx ordered  Code status:  Full  Estimated LOS: Based on ophthalmology recommendation    Again, patient is AOx3. He wants his son Fransisco Kimble to be power of  and he wants to be full code. Discussed with the patient and he would like to update family by himself. I encouraged him to asked the nurse to page me if he or his family has any questions.         Hospital Problems as of 7/26/2021 Never Reviewed        Codes Class Noted - Resolved POA    * (Principal) Corneal ulcer, right ICD-10-CM: H16.001  ICD-9-CM: 370.00  7/24/2021 - Present Unknown        Adenocarcinoma of lung ([facility] [address] ICD-10-CM: C34.90  ICD-9-CM: 162.9  7/24/2021 - Present Unknown        On antineoplastic chemotherapy ICD-10-CM: Z79.899  ICD-9-CM: V58.69  7/24/2021 - Present Unknown                    Objective:     Patient Vitals for the past 24 hrs:   Temp Pulse Resp BP SpO2   07/26/21 1300 98.2 °F (36.8 °C) 94 20 (!) 159/87 97 %   07/26/21 0913 97.9 °F (36.6 °C) 85 20 (!) 142/78 99 %   07/26/21 0453 98.1 °F (36.7 °C) 71 20 127/71 97 %   07/25/21 2333 98 °F (36.7 °C) 78 20 117/65 97 %   07/25/21 2028 98.3 °F (36.8 °C) 88 20 115/60 96 %   07/25/21 1625 98 °F (36.7 °C) 89 20 (!) 144/79 97 %     Oxygen Therapy  O2 Sat (%): 97 % (07/26/21 1300)  Pulse via Oximetry: 82 beats per minute (07/24/21 1644)  O2 Device: None (Room air) (07/26/21 1345)    Estimated body mass index is 25.07 kg/m² as calculated from the following:    Height as of this encounter: 5' 6\" (1.676 m). Weight as of this encounter: 70.4 kg (155 lb 4.8 oz). Intake/Output Summary (Last 24 hours) at 7/26/2021 1611  Last data filed at 7/26/2021 1440  Gross per 24 hour   Intake 1880 ml   Output 2400 ml   Net -520 ml       *Note that automatically entered I/Os may not be accurate; dependent on patient compliance with collection and accurate  by assistants. Physical Exam:  General:    Well nourished. Alert. Eyes:   Right eye with large amount of discharge to the cornea, visible hypopyon and swollen injected sclera  HENT:  Normocephalic, mucous membranes slightly dry  CV:   RRR. No m/r/g. Lungs:  CTAB. No wheezing, rhonchi, or rales. Abdomen: Soft, nontender, nondistended. Extremities: Warm and dry. No cyanosis or edema. Neurologic: AOx3, moving all 4 extremities, speech normal.  Cannot see from right eye at all  Skin:     No rashes or jaundice. Normal coloration  Psych:  Normal mood and affect. I reviewed the labs, imaging, EKGs, telemetry, and other studies done this admission.   Data Reviewed:     Procedures done this admission:  * No surgery found *    All Micro Results     Procedure Component Value Units Date/Time    BLOOD CULTURE [[id number]] Collected: 07/24/21 1452    Order Status: Completed Specimen: Blood Updated: 07/26/21 0715     Special Requests: --        NO SPECIAL REQUESTS  RIGHT  FOREARM       Culture result: NO GROWTH 2 DAYS       BLOOD CULTURE [[id number]] Collected: 07/24/21 1530    Order Status: Completed Specimen: Blood Updated: 07/26/21 0715     Special Requests: --        LEFT  HAND       Culture result: NO GROWTH 2 DAYS             SARS-CoV-2 Lab Results  \"Novel Coronavirus\" Test: No results found for: COV2NT   \"Emergent Disease\" Test: No results found for: EDPR  \"SARS-COV-2\" Test: No results found for: XGCOVT  \"Precision Labs\" Test: No results found for: RSLT  Rapid Test: No results found for: COVR         Labs: Results:       BMP, Mg, Phos Recent Labs     07/26/21  0319 07/25/21  0402 07/24/21  1452   NA  --  139 137   K  --  4.0 3.9   CL  --  106 101   CO2  --  31 32   AGAP  --  2* 4*   BUN  --  14 17   CREA 0.70* 0.71* 0.88   CA  --  9.0 8.9   GLU  --  98 117*      CBC Recent Labs     07/25/21  0402 07/24/21  1452   WBC 5.8 4.8   RBC 4.19* 3.91*   HGB 12.5* 11.6*   HCT 39.4* 36.1*    249   GRANS 57 52   LYMPH 24 28   EOS 1 2   MONOS 12 14*   BASOS 1 0   IG 5 4   ANEU 3.3 2.5   ABL 1.4 1.3   CHACE 0.1 0.1   ABM 0.7 0.7   ABB 0.0 0.0   AIG 0.3 0.2      LFT Recent Labs     07/24/21  1452   ALT 32      TP 7.3   ALB 3.7   GLOB 3.6*   AGRAT 1.0*      Cardiac Testing No results found for: BNPP, BNP, CPK, RCK1, RCK2, RCK3, RCK4, CKMB, CKNDX, CKND1, TROPT, TROIQ   Coagulation Tests No results found for: PTP, INR, APTT, INREXT, INREXT   A1c Lab Results   Component Value Date/Time    Hemoglobin A1c 5.7 07/25/2021 04:02 AM      Lipid Panel No results found for: CHOL, CHOLPOCT, CHOLX, CHLST, CHOLV, 026528, HDL, HDLP, LDL, LDLC, DLDLP, 375810, VLDLC, VLDL, TGLX, TRIGL, TRIGP, TGLPOCT, CHHD, CHHDX   Thyroid Panel No results found for: TSH, T4, FT4, TT3, T3U, TSHEXT, TSHEXT     Most Recent UA No results found for: COLOR, APPRN, REFSG, TREV, PROTU, GLUCU, KETU, BILU, BLDU, UROU, MEGHA, LEUKU, WBCU, RBCU, UEPI, BACTU, CASTS, UCRY, MUCUS, UCOM         Current Facility-Administered Medications   Medication Dose Route Frequency    doxycycline (VIBRAMYCIN) 100 mg in 0.9% sodium chloride (MBP/ADV) 100 mL MBP  100 mg IntraVENous Q12H    ascorbic acid (vitamin C) (VITAMIN C) tablet 1,000 mg  1,000 mg Oral BID    oxyCODONE IR (ROXICODONE) tablet 5 mg  5 mg Oral Q4H PRN    aspirin delayed-release tablet 81 mg  81 mg Oral DAILY    sodium chloride (NS) flush 5-10 mL  5-10 mL IntraVENous Q8H    sodium chloride (NS) flush 5-10 mL  5-10 mL IntraVENous PRN    sodium chloride (NS) flush 5-40 mL  5-40 mL IntraVENous Q8H    sodium chloride (NS) flush 5-40 mL  5-40 mL IntraVENous PRN    acetaminophen (TYLENOL) tablet 650 mg  650 mg Oral Q6H PRN    Or    acetaminophen (TYLENOL) suppository 650 mg  650 mg Rectal Q6H PRN    polyethylene glycol (MIRALAX) packet 17 g  17 g Oral DAILY PRN    ondansetron (ZOFRAN ODT) tablet 4 mg  4 mg Oral Q8H PRN    Or    ondansetron (ZOFRAN) injection 4 mg  4 mg IntraVENous Q6H PRN    enoxaparin (LOVENOX) injection 40 mg  40 mg SubCUTAneous DAILY    piperacillin-tazobactam (ZOSYN) 4.5 g in 0.9% sodium chloride (MBP/ADV) 100 mL MBP  4.5 g IntraVENous Q8H    spironolactone (ALDACTONE) tablet 25 mg  25 mg Oral DAILY    furosemide (LASIX) tablet 40 mg  40 mg Oral DAILY    cholecalciferol (VITAMIN D3) (1000 Units /25 mcg) tablet 1,000 Units  1,000 Units Oral BID    fortified vancomycin 50 mg/ml eye drops (Patient Supplied)  1 Drop Right Eye Q1H    fortified tobramycin (NEBCIN) 15 mg/ml eye drops (Patient Supplied)  1 Drop Ophthalmic Q1H    moxifloxacin (VIGAMOX) 0.5 % ophthalmic solution 1 Drop   1 Drop Right Eye Q1H    vancomycin (VANCOCIN) 1,000 mg in 0.9% sodium chloride 250 mL (VIAL-MATE)  1,000 mg IntraVENous Q12H       Other Studies:  No results found for this visit on 07/24/21. No results found. SARS-CoV-2 Lab Results  \"Novel Coronavirus\" Test: No results found for: COV2NT   \"Emergent Disease\" Test: No results found for: EDPR  \"SARS-COV-2\" Test: No results found for: XGCOVT  Rapid Test: No results found for: COVR            Part of this note was written by using a voice dictation software and the note has been proof read but may still contain some grammatical/other typographical errors.     Signed:  Dragan Mcneil MD

## 2021-07-27 PROBLEM — H15.091: Status: ACTIVE | Noted: 2021-07-27

## 2021-07-27 LAB
ANION GAP SERPL CALC-SCNC: 6 MMOL/L (ref 7–16)
BASOPHILS # BLD: 0 K/UL (ref 0–0.2)
BASOPHILS NFR BLD: 1 % (ref 0–2)
BUN SERPL-MCNC: 14 MG/DL (ref 8–23)
CALCIUM SERPL-MCNC: 9.1 MG/DL (ref 8.3–10.4)
CHLORIDE SERPL-SCNC: 102 MMOL/L (ref 98–107)
CO2 SERPL-SCNC: 30 MMOL/L (ref 21–32)
CREAT SERPL-MCNC: 0.69 MG/DL (ref 0.8–1.5)
DIFFERENTIAL METHOD BLD: ABNORMAL
EOSINOPHIL # BLD: 0.1 K/UL (ref 0–0.8)
EOSINOPHIL NFR BLD: 1 % (ref 0.5–7.8)
ERYTHROCYTE [DISTWIDTH] IN BLOOD BY AUTOMATED COUNT: 14.9 % (ref 11.9–14.6)
GLUCOSE BLD STRIP.AUTO-MCNC: 105 MG/DL (ref 65–100)
GLUCOSE BLD STRIP.AUTO-MCNC: 121 MG/DL (ref 65–100)
GLUCOSE SERPL-MCNC: 101 MG/DL (ref 65–100)
HCT VFR BLD AUTO: 36.8 % (ref 41.1–50.3)
HGB BLD-MCNC: 11.6 G/DL (ref 13.6–17.2)
IMM GRANULOCYTES # BLD AUTO: 0.1 K/UL (ref 0–0.5)
IMM GRANULOCYTES NFR BLD AUTO: 3 % (ref 0–5)
LYMPHOCYTES # BLD: 1.2 K/UL (ref 0.5–4.6)
LYMPHOCYTES NFR BLD: 22 % (ref 13–44)
MAGNESIUM SERPL-MCNC: 2 MG/DL (ref 1.8–2.4)
MCH RBC QN AUTO: 29.5 PG (ref 26.1–32.9)
MCHC RBC AUTO-ENTMCNC: 31.5 G/DL (ref 31.4–35)
MCV RBC AUTO: 93.6 FL (ref 79.6–97.8)
MONOCYTES # BLD: 0.7 K/UL (ref 0.1–1.3)
MONOCYTES NFR BLD: 12 % (ref 4–12)
NEUTS SEG # BLD: 3.2 K/UL (ref 1.7–8.2)
NEUTS SEG NFR BLD: 61 % (ref 43–78)
NRBC # BLD: 0 K/UL (ref 0–0.2)
PHOSPHATE SERPL-MCNC: 3.6 MG/DL (ref 2.3–3.7)
PLATELET # BLD AUTO: 220 K/UL (ref 150–450)
PMV BLD AUTO: 11 FL (ref 9.4–12.3)
POTASSIUM SERPL-SCNC: 3.6 MMOL/L (ref 3.5–5.1)
RBC # BLD AUTO: 3.93 M/UL (ref 4.23–5.6)
SERVICE CMNT-IMP: ABNORMAL
SERVICE CMNT-IMP: ABNORMAL
SODIUM SERPL-SCNC: 138 MMOL/L (ref 138–145)
WBC # BLD AUTO: 5.3 K/UL (ref 4.3–11.1)

## 2021-07-27 PROCEDURE — 83735 ASSAY OF MAGNESIUM: CPT

## 2021-07-27 PROCEDURE — 74011250636 HC RX REV CODE- 250/636: Performed by: FAMILY MEDICINE

## 2021-07-27 PROCEDURE — 80048 BASIC METABOLIC PNL TOTAL CA: CPT

## 2021-07-27 PROCEDURE — 74011000258 HC RX REV CODE- 258: Performed by: FAMILY MEDICINE

## 2021-07-27 PROCEDURE — 74011250637 HC RX REV CODE- 250/637: Performed by: FAMILY MEDICINE

## 2021-07-27 PROCEDURE — 36415 COLL VENOUS BLD VENIPUNCTURE: CPT

## 2021-07-27 PROCEDURE — 74011000258 HC RX REV CODE- 258: Performed by: INTERNAL MEDICINE

## 2021-07-27 PROCEDURE — 65270000029 HC RM PRIVATE

## 2021-07-27 PROCEDURE — 74011250637 HC RX REV CODE- 250/637: Performed by: INTERNAL MEDICINE

## 2021-07-27 PROCEDURE — 84100 ASSAY OF PHOSPHORUS: CPT

## 2021-07-27 PROCEDURE — 74011250636 HC RX REV CODE- 250/636: Performed by: INTERNAL MEDICINE

## 2021-07-27 PROCEDURE — 74011000250 HC RX REV CODE- 250: Performed by: FAMILY MEDICINE

## 2021-07-27 PROCEDURE — 82962 GLUCOSE BLOOD TEST: CPT

## 2021-07-27 PROCEDURE — 85025 COMPLETE CBC W/AUTO DIFF WBC: CPT

## 2021-07-27 RX ORDER — CIPROFLOXACIN HYDROCHLORIDE 3.5 MG/ML
1 SOLUTION/ DROPS TOPICAL
Status: DISCONTINUED | OUTPATIENT
Start: 2021-07-27 | End: 2021-07-31

## 2021-07-27 RX ADMIN — VANCOMYCIN HYDROCHLORIDE 1 DROP: 1 INJECTION, POWDER, LYOPHILIZED, FOR SOLUTION INTRAVENOUS at 17:05

## 2021-07-27 RX ADMIN — VANCOMYCIN HYDROCHLORIDE 1 DROP: 1 INJECTION, POWDER, LYOPHILIZED, FOR SOLUTION INTRAVENOUS at 19:04

## 2021-07-27 RX ADMIN — VANCOMYCIN HYDROCHLORIDE 1 DROP: 1 INJECTION, POWDER, LYOPHILIZED, FOR SOLUTION INTRAVENOUS at 03:23

## 2021-07-27 RX ADMIN — TOBRAMYCIN 1 DROP: 3 SOLUTION OPHTHALMIC at 17:05

## 2021-07-27 RX ADMIN — PIPERACILLIN SODIUM AND TAZOBACTAM SODIUM 4.5 G: 4; .5 INJECTION, POWDER, LYOPHILIZED, FOR SOLUTION INTRAVENOUS at 09:13

## 2021-07-27 RX ADMIN — PIPERACILLIN SODIUM AND TAZOBACTAM SODIUM 4.5 G: 4; .5 INJECTION, POWDER, LYOPHILIZED, FOR SOLUTION INTRAVENOUS at 02:18

## 2021-07-27 RX ADMIN — Medication 10 ML: at 21:05

## 2021-07-27 RX ADMIN — VANCOMYCIN HYDROCHLORIDE 1 DROP: 1 INJECTION, POWDER, LYOPHILIZED, FOR SOLUTION INTRAVENOUS at 05:20

## 2021-07-27 RX ADMIN — TOBRAMYCIN 1 DROP: 3 SOLUTION OPHTHALMIC at 07:09

## 2021-07-27 RX ADMIN — TOBRAMYCIN 1 DROP: 3 SOLUTION OPHTHALMIC at 19:04

## 2021-07-27 RX ADMIN — Medication 1000 MG: at 17:06

## 2021-07-27 RX ADMIN — MOXIFLOXACIN 1 DROP: 5 SOLUTION/ DROPS OPHTHALMIC at 07:10

## 2021-07-27 RX ADMIN — CIPROFLOXACIN 1 DROP: 3 SOLUTION OPHTHALMIC at 23:19

## 2021-07-27 RX ADMIN — TOBRAMYCIN 1 DROP: 3 SOLUTION OPHTHALMIC at 10:25

## 2021-07-27 RX ADMIN — TOBRAMYCIN 1 DROP: 3 SOLUTION OPHTHALMIC at 23:20

## 2021-07-27 RX ADMIN — VANCOMYCIN HYDROCHLORIDE 1 DROP: 1 INJECTION, POWDER, LYOPHILIZED, FOR SOLUTION INTRAVENOUS at 21:05

## 2021-07-27 RX ADMIN — MOXIFLOXACIN 1 DROP: 5 SOLUTION/ DROPS OPHTHALMIC at 01:35

## 2021-07-27 RX ADMIN — VANCOMYCIN HYDROCHLORIDE 1 DROP: 1 INJECTION, POWDER, LYOPHILIZED, FOR SOLUTION INTRAVENOUS at 14:01

## 2021-07-27 RX ADMIN — TOBRAMYCIN 1 DROP: 3 SOLUTION OPHTHALMIC at 01:35

## 2021-07-27 RX ADMIN — MOXIFLOXACIN 1 DROP: 5 SOLUTION/ DROPS OPHTHALMIC at 20:01

## 2021-07-27 RX ADMIN — VANCOMYCIN HYDROCHLORIDE 1 DROP: 1 INJECTION, POWDER, LYOPHILIZED, FOR SOLUTION INTRAVENOUS at 08:30

## 2021-07-27 RX ADMIN — MOXIFLOXACIN 1 DROP: 5 SOLUTION/ DROPS OPHTHALMIC at 11:02

## 2021-07-27 RX ADMIN — TOBRAMYCIN 1 DROP: 3 SOLUTION OPHTHALMIC at 08:30

## 2021-07-27 RX ADMIN — TOBRAMYCIN 1 DROP: 3 SOLUTION OPHTHALMIC at 12:03

## 2021-07-27 RX ADMIN — Medication 10 ML: at 06:33

## 2021-07-27 RX ADMIN — VANCOMYCIN HYDROCHLORIDE 1 DROP: 1 INJECTION, POWDER, LYOPHILIZED, FOR SOLUTION INTRAVENOUS at 18:02

## 2021-07-27 RX ADMIN — MOXIFLOXACIN 1 DROP: 5 SOLUTION/ DROPS OPHTHALMIC at 16:06

## 2021-07-27 RX ADMIN — ACETAMINOPHEN 650 MG: 325 TABLET ORAL at 14:07

## 2021-07-27 RX ADMIN — Medication 1000 MG: at 08:26

## 2021-07-27 RX ADMIN — ASPIRIN 81 MG: 81 TABLET ORAL at 08:26

## 2021-07-27 RX ADMIN — VITAMIN D, TAB 1000IU (100/BT) 1000 UNITS: 25 TAB at 08:26

## 2021-07-27 RX ADMIN — FUROSEMIDE 40 MG: 40 TABLET ORAL at 08:26

## 2021-07-27 RX ADMIN — MOXIFLOXACIN 1 DROP: 5 SOLUTION/ DROPS OPHTHALMIC at 02:27

## 2021-07-27 RX ADMIN — TOBRAMYCIN 1 DROP: 3 SOLUTION OPHTHALMIC at 12:56

## 2021-07-27 RX ADMIN — VANCOMYCIN HYDROCHLORIDE 1 DROP: 1 INJECTION, POWDER, LYOPHILIZED, FOR SOLUTION INTRAVENOUS at 16:05

## 2021-07-27 RX ADMIN — VANCOMYCIN HYDROCHLORIDE 1 DROP: 1 INJECTION, POWDER, LYOPHILIZED, FOR SOLUTION INTRAVENOUS at 07:09

## 2021-07-27 RX ADMIN — TOBRAMYCIN 1 DROP: 3 SOLUTION OPHTHALMIC at 03:23

## 2021-07-27 RX ADMIN — MOXIFLOXACIN 1 DROP: 5 SOLUTION/ DROPS OPHTHALMIC at 04:30

## 2021-07-27 RX ADMIN — Medication 10 ML: at 02:36

## 2021-07-27 RX ADMIN — TOBRAMYCIN 1 DROP: 3 SOLUTION OPHTHALMIC at 22:10

## 2021-07-27 RX ADMIN — TOBRAMYCIN 1 DROP: 3 SOLUTION OPHTHALMIC at 21:05

## 2021-07-27 RX ADMIN — DOXYCYCLINE 100 MG: 100 INJECTION, POWDER, LYOPHILIZED, FOR SOLUTION INTRAVENOUS at 20:15

## 2021-07-27 RX ADMIN — VANCOMYCIN HYDROCHLORIDE 1 DROP: 1 INJECTION, POWDER, LYOPHILIZED, FOR SOLUTION INTRAVENOUS at 00:30

## 2021-07-27 RX ADMIN — MOXIFLOXACIN 1 DROP: 5 SOLUTION/ DROPS OPHTHALMIC at 10:25

## 2021-07-27 RX ADMIN — VANCOMYCIN HYDROCHLORIDE 1 DROP: 1 INJECTION, POWDER, LYOPHILIZED, FOR SOLUTION INTRAVENOUS at 11:02

## 2021-07-27 RX ADMIN — TOBRAMYCIN 1 DROP: 3 SOLUTION OPHTHALMIC at 15:02

## 2021-07-27 RX ADMIN — PIPERACILLIN SODIUM AND TAZOBACTAM SODIUM 4.5 G: 4; .5 INJECTION, POWDER, LYOPHILIZED, FOR SOLUTION INTRAVENOUS at 17:06

## 2021-07-27 RX ADMIN — TOBRAMYCIN 1 DROP: 3 SOLUTION OPHTHALMIC at 04:30

## 2021-07-27 RX ADMIN — TOBRAMYCIN 1 DROP: 3 SOLUTION OPHTHALMIC at 02:26

## 2021-07-27 RX ADMIN — MOXIFLOXACIN 1 DROP: 5 SOLUTION/ DROPS OPHTHALMIC at 14:01

## 2021-07-27 RX ADMIN — TOBRAMYCIN 1 DROP: 3 SOLUTION OPHTHALMIC at 18:02

## 2021-07-27 RX ADMIN — MOXIFLOXACIN 1 DROP: 5 SOLUTION/ DROPS OPHTHALMIC at 09:13

## 2021-07-27 RX ADMIN — TOBRAMYCIN 1 DROP: 3 SOLUTION OPHTHALMIC at 20:01

## 2021-07-27 RX ADMIN — TOBRAMYCIN 1 DROP: 3 SOLUTION OPHTHALMIC at 00:30

## 2021-07-27 RX ADMIN — VANCOMYCIN HYDROCHLORIDE 1 DROP: 1 INJECTION, POWDER, LYOPHILIZED, FOR SOLUTION INTRAVENOUS at 15:02

## 2021-07-27 RX ADMIN — VANCOMYCIN HYDROCHLORIDE 1 DROP: 1 INJECTION, POWDER, LYOPHILIZED, FOR SOLUTION INTRAVENOUS at 12:02

## 2021-07-27 RX ADMIN — VANCOMYCIN HYDROCHLORIDE 1 DROP: 1 INJECTION, POWDER, LYOPHILIZED, FOR SOLUTION INTRAVENOUS at 23:20

## 2021-07-27 RX ADMIN — TOBRAMYCIN 1 DROP: 3 SOLUTION OPHTHALMIC at 05:20

## 2021-07-27 RX ADMIN — VANCOMYCIN HYDROCHLORIDE 1 DROP: 1 INJECTION, POWDER, LYOPHILIZED, FOR SOLUTION INTRAVENOUS at 04:30

## 2021-07-27 RX ADMIN — MOXIFLOXACIN 1 DROP: 5 SOLUTION/ DROPS OPHTHALMIC at 08:30

## 2021-07-27 RX ADMIN — MOXIFLOXACIN 1 DROP: 5 SOLUTION/ DROPS OPHTHALMIC at 12:03

## 2021-07-27 RX ADMIN — ENOXAPARIN SODIUM 40 MG: 40 INJECTION SUBCUTANEOUS at 08:25

## 2021-07-27 RX ADMIN — DOXYCYCLINE 100 MG: 100 INJECTION, POWDER, LYOPHILIZED, FOR SOLUTION INTRAVENOUS at 07:08

## 2021-07-27 RX ADMIN — VANCOMYCIN HYDROCHLORIDE 1 DROP: 1 INJECTION, POWDER, LYOPHILIZED, FOR SOLUTION INTRAVENOUS at 01:35

## 2021-07-27 RX ADMIN — VANCOMYCIN HYDROCHLORIDE 1 DROP: 1 INJECTION, POWDER, LYOPHILIZED, FOR SOLUTION INTRAVENOUS at 09:13

## 2021-07-27 RX ADMIN — MOXIFLOXACIN 1 DROP: 5 SOLUTION/ DROPS OPHTHALMIC at 06:30

## 2021-07-27 RX ADMIN — VANCOMYCIN HYDROCHLORIDE 1 DROP: 1 INJECTION, POWDER, LYOPHILIZED, FOR SOLUTION INTRAVENOUS at 20:01

## 2021-07-27 RX ADMIN — ACETAMINOPHEN 650 MG: 325 TABLET ORAL at 23:27

## 2021-07-27 RX ADMIN — VITAMIN D, TAB 1000IU (100/BT) 1000 UNITS: 25 TAB at 17:06

## 2021-07-27 RX ADMIN — TOBRAMYCIN 1 DROP: 3 SOLUTION OPHTHALMIC at 14:01

## 2021-07-27 RX ADMIN — TOBRAMYCIN 1 DROP: 3 SOLUTION OPHTHALMIC at 16:06

## 2021-07-27 RX ADMIN — MOXIFLOXACIN 1 DROP: 5 SOLUTION/ DROPS OPHTHALMIC at 22:10

## 2021-07-27 RX ADMIN — TOBRAMYCIN 1 DROP: 3 SOLUTION OPHTHALMIC at 09:13

## 2021-07-27 RX ADMIN — MOXIFLOXACIN 1 DROP: 5 SOLUTION/ DROPS OPHTHALMIC at 00:30

## 2021-07-27 RX ADMIN — TOBRAMYCIN 1 DROP: 3 SOLUTION OPHTHALMIC at 11:02

## 2021-07-27 RX ADMIN — MOXIFLOXACIN 1 DROP: 5 SOLUTION/ DROPS OPHTHALMIC at 18:02

## 2021-07-27 RX ADMIN — MOXIFLOXACIN 1 DROP: 5 SOLUTION/ DROPS OPHTHALMIC at 05:20

## 2021-07-27 RX ADMIN — VANCOMYCIN HYDROCHLORIDE 1 DROP: 1 INJECTION, POWDER, LYOPHILIZED, FOR SOLUTION INTRAVENOUS at 06:30

## 2021-07-27 RX ADMIN — VANCOMYCIN HYDROCHLORIDE 1 DROP: 1 INJECTION, POWDER, LYOPHILIZED, FOR SOLUTION INTRAVENOUS at 10:25

## 2021-07-27 RX ADMIN — MOXIFLOXACIN 1 DROP: 5 SOLUTION/ DROPS OPHTHALMIC at 17:06

## 2021-07-27 RX ADMIN — MOXIFLOXACIN 1 DROP: 5 SOLUTION/ DROPS OPHTHALMIC at 15:02

## 2021-07-27 RX ADMIN — SPIRONOLACTONE 25 MG: 25 TABLET ORAL at 08:26

## 2021-07-27 RX ADMIN — MOXIFLOXACIN 1 DROP: 5 SOLUTION/ DROPS OPHTHALMIC at 12:56

## 2021-07-27 RX ADMIN — VANCOMYCIN HYDROCHLORIDE 1 DROP: 1 INJECTION, POWDER, LYOPHILIZED, FOR SOLUTION INTRAVENOUS at 12:55

## 2021-07-27 RX ADMIN — Medication 10 ML: at 13:03

## 2021-07-27 RX ADMIN — MOXIFLOXACIN 1 DROP: 5 SOLUTION/ DROPS OPHTHALMIC at 19:04

## 2021-07-27 RX ADMIN — MOXIFLOXACIN 1 DROP: 5 SOLUTION/ DROPS OPHTHALMIC at 03:22

## 2021-07-27 RX ADMIN — VANCOMYCIN HYDROCHLORIDE 1 DROP: 1 INJECTION, POWDER, LYOPHILIZED, FOR SOLUTION INTRAVENOUS at 22:11

## 2021-07-27 RX ADMIN — MOXIFLOXACIN 1 DROP: 5 SOLUTION/ DROPS OPHTHALMIC at 21:05

## 2021-07-27 RX ADMIN — TOBRAMYCIN 1 DROP: 3 SOLUTION OPHTHALMIC at 06:30

## 2021-07-27 RX ADMIN — VANCOMYCIN HYDROCHLORIDE 1 DROP: 1 INJECTION, POWDER, LYOPHILIZED, FOR SOLUTION INTRAVENOUS at 02:26

## 2021-07-27 RX ADMIN — ACETAMINOPHEN 650 MG: 325 TABLET ORAL at 06:35

## 2021-07-27 NOTE — PROGRESS NOTES
Hourly rounds performed through shift, pt denies needs at this time. Bed in low position, locked and call light/personal items within reach. Pt to be NPO at midnight. Will report to night shift nurse.

## 2021-07-27 NOTE — PROGRESS NOTES
TRANSFER - IN REPORT:    Verbal report received from Chapito Lincoln rn on Lexine Friends  being received from [facility] for routine progression of care      Report consisted of patients Situation, Background, Assessment and   Recommendations(SBAR). Information from the following report(s) SBAR, Kardex, [facility] and Recent Results was reviewed with the receiving nurse. Opportunity for questions and clarification was provided. Assessment will be completed upon patients arrival to unit and care assumed.

## 2021-07-27 NOTE — PROGRESS NOTES
07/27/21 1056   Dual Skin Pressure Injury Assessment   Dual Skin Pressure Injury Assessment WDL   Second Care Provider (Based on [address] [place] [address]) ALONSO Woods     All skin, including argelia heels and sacrum intact, no breakdown noted.

## 2021-07-27 NOTE — PROGRESS NOTES
Chart reviewed. Pt transferred to 9th floor. BSNJOSE G met with pt at bedside with appropriate PPE and social distancing. Pt lying in bed. Pt alert and oriented to person, place, time, and situation. Pt verified demographic information. PCP verified as Dr. Luna Beach and pt was last seen by PCP in May. PCP added to chart. Pt lives in 1 story home alone, 1 steps to enter into the home. Pt reports being independent with ADLs and driving prior to admission. Pt reports having no DMEs. Pt family able to transport home, to doctor apt,  medications, and get groceries. Pt primary pharmacy is [facility] in [place]. Pt reports being able to afford medications. Pt reports plans to discharge home. Pt has never used HH or been to SNF. No anticipated needs at this time. CM to continue to follow and monitor for any needs that may occur. Care Management Interventions  PCP Verified by CM: Yes (Dr. Luna Beach)  Mode of Transport at Discharge:  Other (see comment) (Family)  Transition of Care Consult (CM Consult): Discharge Planning  Discharge Durable Medical Equipment: No  Physical Therapy Consult: No  Occupational Therapy Consult: No  Speech Therapy Consult: No  Current Support Network: Family Lives [place], Own Home  Confirm Follow Up Transport: Family  The Plan for Transition of Care is Related to the Following Treatment Goals : Return  home and back to his baseline  Discharge Location  Discharge Placement: Home

## 2021-07-27 NOTE — PROGRESS NOTES
[facility] Hospitalist Note     Admit Date:  2021  3:18 PM   Name:  Mady Izquierdo   Age:  59 y.o.  :  1960   MRN:  [id number]   PCP:  Colleen Gibson MD  Treatment Team: Attending Provider: Mejia Guzman MD; Consulting Provider: Chelsie Garduno MD; Primary Nurse: Mel Palma Primary Nurse: Bria Navarro RN; Utilization Review: Herbie Disla RN; Primary Nurse: Luna Yang RN; Care Manager: Michelet Rivas RN    HPI/Subjective:     Patient is a 76-year-old man with past medical history significant for CAD, adenocarcinoma of lung, on chemotherapy presented to the ED referred by ophthalmology for failed outpatient therapy for eye infection. Patient reports he was working in his yard on  when a piece of grass flew into his right eye. He flushed his eye with warm tap water but next day he woke up with blurred vision, painful red eye. He was seen at [facility] ED and was discharged home on amoxicillin and some antibiotic ointment. Patient reports symptoms persistent and he was seen by ophthalmology on  and was placed on oral Cipro, doxycycline and tobramycin ophthalmic drops every hour, moxifloxacin ophthalmic drops every hour and vancomycin eyedrops. Patient reports symptom has had worsened significantly with persistent discharge, and no vision in RT eye. He was evaluated by ophthalmology who tried to admit patient directly but no bed was available. Patient was sent to the ER for IV antibiotics and Dr. Linn Murray of [facility] will see patient in the morning. Patient denies any other symptoms. Denies fever, chills, nausea, vomiting, abdominal pain, shortness of breath or chest pain. In the ED patient was vitally stable. Labs unremarkable. Hospitalist consulted for admission. 2021: As per patient his pain is better. Still has pain in his right eye with discharge. Denies any nausea, vomiting, chest pain or palpitations. : As per patient is feeling same. Waiting for ophthalmologist to evaluate. July 27: As per patient his ophthalmologist has already evaluated him and plan is to do surgery tomorrow. As per patient he can open his eyes which is better. Denies any chest pain, shortness of breath, nausea or vomiting. Rest of the system negative except mentioned above. Assessment and Plan:     Right corneal ulcer/hypopyon:  Cultures from [facility] showed pansensitive Pseudomonas  Start patient on vancomycin/Zosyn, pharmacy consulted to adjust dose  Dr. Seema Correa of [facility] eye consulted  ED spoke to Dr. Seema Correa who recommend patient be n.p.o. after midnight for possible surgical debridement tomorrow  Continue tobramycin and moxifloxacin eyedrops  Continue vitamin D 1000 mg twice daily    July 25: Still significant infection. On Zosyn 4.5 g to cover Pseudomonas. Also on vancomycin for now. On eyedrops to cover Pseudomonas as well as other infection. Dr. Seema Correa will see patient today as patient has already talked to him. Patient is n.p.o. Slow IV fluid hydration  Pain control with oxycodone. July 26: Doxycycline added to regimen as recommended by ophthalmologist.  Blood cultures negative and per ophthalmology note, his culture as outpatient showed Pseudomonas so we will discontinue vancomycin. Surgical intervention per ophthalmology. July 27: Continue eyedrops, Zosyn and doxycycline. Appreciate ophthalmology recs. CHF/CAD:  Continue Lasix,and spironolactone  Continue aspirin. Holding Plavix given possible surgery. Resumption of Plavix per ophthalmologist recommendation. July 27: As per patient, he is on Plavix for his chemotherapy he does not have any stent in his body. Will resume Plavix based on ophthalmology recommendation given possible surgery.     Adenocarcinoma of lung, on chemotherapy:  Outpatient oncology follow-up    Discharge planning: Admit patient to the medical floor    DVT ppx ordered  Code status:  Full  Estimated LOS: Based on ophthalmology recommendation    Again, patient is AOx3. He wants his son Jennifer Edouard to be power of  and he wants to be full code. Discussed with the patient and he would like to update family by himself. I encouraged him to asked the nurse to page me if he or his family has any questions. 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  ICD-9-CM: 379.09  7/27/2021 - Present Unknown        * (Principal) Corneal ulcer, right ICD-10-CM: H16.001  ICD-9-CM: 370.00  7/24/2021 - Present Unknown        Adenocarcinoma of lung ([facility] [address] ICD-10-CM: C34.90  ICD-9-CM: 162.9  7/24/2021 - Present Unknown        On antineoplastic chemotherapy ICD-10-CM: Z79.899  ICD-9-CM: V58.69  7/24/2021 - Present Unknown                    Objective:     Patient Vitals for the past 24 hrs:   Temp Pulse Resp BP SpO2   07/27/21 0915 98.3 °F (36.8 °C) 92 20 135/76 96 %   07/27/21 0508 98.7 °F (37.1 °C) 82 20 119/76 97 %   07/27/21 0043 98.8 °F (37.1 °C) 82 20 113/67 96 %   07/26/21 2016 97.9 °F (36.6 °C) 82 20 95/66 97 %   07/26/21 1838 98 °F (36.7 °C) 88 20 135/77 98 %   07/26/21 1300 98.2 °F (36.8 °C) 94 20 (!) 159/87 97 %     Oxygen Therapy  O2 Sat (%): 96 % (07/27/21 0915)  Pulse via Oximetry: 82 beats per minute (07/24/21 1644)  O2 Device: None (Room air) (07/27/21 0915)    Estimated body mass index is 24.31 kg/m² as calculated from the following:    Height as of this encounter: 5' 6\" (1.676 m). Weight as of this encounter: 68.3 kg (150 lb 9.6 oz). Intake/Output Summary (Last 24 hours) at 7/27/2021 1157  Last data filed at 7/27/2021 0915  Gross per 24 hour   Intake 1240 ml   Output 2800 ml   Net -1560 ml       *Note that automatically entered I/Os may not be accurate; dependent on patient compliance with collection and accurate  by assistants. Physical Exam:  General:    Well nourished. Alert.     Eyes:   Right eye with large amount of discharge to the cornea, visible hypopyon and swollen injected sclera  HENT:  Normocephalic, mucous membranes slightly dry  CV:   RRR. No m/r/g. Lungs:  CTAB. No wheezing, rhonchi, or rales. Abdomen: Soft, nontender, nondistended. Extremities: Warm and dry. No cyanosis or edema. Neurologic: AOx3, moving all 4 extremities, speech normal.  Cannot see from right eye at all  Skin:     No rashes or jaundice. Normal coloration  Psych:  Normal mood and affect. I reviewed the labs, imaging, EKGs, telemetry, and other studies done this admission. Data Reviewed:     Procedures done this admission:  * No surgery found *    All Micro Results     Procedure Component Value Units Date/Time    BLOOD CULTURE [[id number]] Collected: 07/24/21 1452    Order Status: Completed Specimen: Blood Updated: 07/27/21 0655     Special Requests: --        NO SPECIAL REQUESTS  RIGHT  FOREARM       Culture result: NO GROWTH 3 DAYS       BLOOD CULTURE [[id number]] Collected: 07/24/21 1530    Order Status: Completed Specimen: Blood Updated: 07/27/21 0655     Special Requests: --        LEFT  HAND       Culture result: NO GROWTH 3 DAYS             SARS-CoV-2 Lab Results  \"Novel Coronavirus\" Test: No results found for: COV2NT   \"Emergent Disease\" Test: No results found for: EDPR  \"SARS-COV-2\" Test: No results found for: XGCOVT  \"Precision Labs\" Test: No results found for: RSLT  Rapid Test: No results found for: COVR         Labs: Results:       BMP, Mg, Phos Recent Labs     07/27/21  0435 07/26/21  0319 07/25/21  0402 07/24/21  1452 07/24/21  1452     --  139  --  137   K 3.6  --  4.0  --  3.9     --  106  --  101   CO2 30  --  31  --  32   AGAP 6*  --  2*  --  4*   BUN 14  --  14  --  17   CREA 0.69* 0.70* 0.71*   < > 0.88   CA 9.1  --  9.0  --  8.9   *  --  98  --  117*   MG 2.0  --   --   --   --    PHOS 3.6  --   --   --   --     < > = values in this interval not displayed.       CBC Recent Labs 07/27/21  0436 07/25/21  0402 07/24/21  1452   WBC 5.3 5.8 4.8   RBC 3.93* 4.19* 3.91*   HGB 11.6* 12.5* 11.6*   HCT 36.8* 39.4* 36.1*    220 249   GRANS 61 57 52   LYMPH 22 24 28   EOS 1 1 2   MONOS 12 12 14*   BASOS 1 1 0   IG 3 5 4   ANEU 3.2 3.3 2.5   ABL 1.2 1.4 1.3   CHACE 0.1 0.1 0.1   ABM 0.7 0.7 0.7   ABB 0.0 0.0 0.0   AIG 0.1 0.3 0.2      LFT Recent Labs     07/24/21  1452   ALT 32      TP 7.3   ALB 3.7   GLOB 3.6*   AGRAT 1.0*      Cardiac Testing No results found for: BNPP, BNP, CPK, RCK1, RCK2, RCK3, RCK4, CKMB, CKNDX, CKND1, TROPT, TROIQ   Coagulation Tests No results found for: PTP, INR, APTT, INREXT, INREXT   A1c Lab Results   Component Value Date/Time    Hemoglobin A1c 5.7 07/25/2021 04:02 AM      Lipid Panel No results found for: CHOL, CHOLPOCT, CHOLX, CHLST, CHOLV, 757745, HDL, HDLP, LDL, LDLC, DLDLP, 482072, VLDLC, VLDL, TGLX, TRIGL, TRIGP, TGLPOCT, CHHD, CHHDX   Thyroid Panel No results found for: TSH, T4, FT4, TT3, T3U, TSHEXT, TSHEXT     Most Recent UA No results found for: COLOR, APPRN, REFSG, TREV, PROTU, GLUCU, KETU, BILU, BLDU, UROU, MEGHA, LEUKU, WBCU, RBCU, UEPI, BACTU, CASTS, UCRY, MUCUS, UCOM         Current Facility-Administered Medications   Medication Dose Route Frequency    doxycycline (VIBRAMYCIN) 100 mg in 0.9% sodium chloride (MBP/ADV) 100 mL MBP  100 mg IntraVENous Q12H    ascorbic acid (vitamin C) (VITAMIN C) tablet 1,000 mg  1,000 mg Oral BID    oxyCODONE IR (ROXICODONE) tablet 5 mg  5 mg Oral Q4H PRN    aspirin delayed-release tablet 81 mg  81 mg Oral DAILY    sodium chloride (NS) flush 5-10 mL  5-10 mL IntraVENous Q8H    sodium chloride (NS) flush 5-10 mL  5-10 mL IntraVENous PRN    sodium chloride (NS) flush 5-40 mL  5-40 mL IntraVENous PRN    acetaminophen (TYLENOL) tablet 650 mg  650 mg Oral Q6H PRN    Or    acetaminophen (TYLENOL) suppository 650 mg  650 mg Rectal Q6H PRN    polyethylene glycol (MIRALAX) packet 17 g  17 g Oral DAILY PRN    ondansetron (ZOFRAN ODT) tablet 4 mg  4 mg Oral Q8H PRN    Or    ondansetron (ZOFRAN) injection 4 mg  4 mg IntraVENous Q6H PRN    enoxaparin (LOVENOX) injection 40 mg  40 mg SubCUTAneous DAILY    piperacillin-tazobactam (ZOSYN) 4.5 g in 0.9% sodium chloride (MBP/ADV) 100 mL MBP  4.5 g IntraVENous Q8H    spironolactone (ALDACTONE) tablet 25 mg  25 mg Oral DAILY    furosemide (LASIX) tablet 40 mg  40 mg Oral DAILY    cholecalciferol (VITAMIN D3) (1000 Units /25 mcg) tablet 1,000 Units  1,000 Units Oral BID    fortified vancomycin 50 mg/ml eye drops (Patient Supplied)  1 Drop Right Eye Q1H    fortified tobramycin (NEBCIN) 15 mg/ml eye drops (Patient Supplied)  1 Drop Ophthalmic Q1H    moxifloxacin (VIGAMOX) 0.5 % ophthalmic solution 1 Drop   1 Drop Right Eye Q1H       Other Studies:  No results found for this visit on 07/24/21. No results found. SARS-CoV-2 Lab Results  \"Novel Coronavirus\" Test: No results found for: COV2NT   \"Emergent Disease\" Test: No results found for: EDPR  \"SARS-COV-2\" Test: No results found for: XGCOVT  Rapid Test: No results found for: COVR            Part of this note was written by using a voice dictation software and the note has been proof read but may still contain some grammatical/other typographical errors.     Signed:  Dragan Mcneil MD

## 2021-07-27 NOTE — PROGRESS NOTES
TRANSFER - OUT REPORT:    Verbal report given to [address] Lillian Olvera RN(name) on Dann Akers  being transferred to [facility](unit) for routine progression of care       Report consisted of patients Situation, Background, Assessment and   Recommendations(SBAR). Information from the following report(s) SBAR, Kardex, ED Summary, Procedure Summary, Intake/Output and MAR was reviewed with the receiving nurse. Lines:   Peripheral IV 07/24/21 Left Hand (Active)   Site Assessment Clean, dry, & intact 07/27/21 0835   Phlebitis Assessment 0 07/27/21 0835   Infiltration Assessment 0 07/27/21 0835   Dressing Status Clean, dry, & intact 07/27/21 0835   Dressing Type Tape;Transparent 07/27/21 0835   Hub Color/Line Status Patent; Infusing 07/27/21 0835   Alcohol Cap Used No 07/26/21 0430       Peripheral IV 15/61/59 Right Basilic (Active)   Site Assessment Clean, dry, & intact 07/27/21 0835   Phlebitis Assessment 0 07/27/21 0835   Infiltration Assessment 0 07/27/21 0835   Dressing Status Clean, dry, & intact 07/27/21 0835   Dressing Type Tape;Transparent 07/27/21 0835   Hub Color/Line Status Patent 07/27/21 0835   Alcohol Cap Used No 07/26/21 0430        Opportunity for questions and clarification was provided.       Patient transported with:   Patient's medications from home  Tech

## 2021-07-27 NOTE — PROGRESS NOTES
Pt presented to the ED referred by ophthalmology for failed OP therapy for an eye infection. Pt reported that he was working in his yard on 7/14 when a piece of grass flew into his right eye. He flushed his eye with warm tap water but the next day he woke up with blurred vision and a painful red eye. CM attempted to see pt for assessment and dcp but was PERCY. CM will revisit. Care Management Interventions  Mode of Transport at Discharge:  Other (see comment) (Family)  Transition of Care Consult (CM Consult): Discharge Planning  Current Support Network: Family Lives [place], Own Home  Confirm Follow Up Transport: Family  The Plan for Transition of Care is Related to the Following Treatment Goals : Return  home and back to his baseline  Discharge Location  Discharge Placement: Home

## 2021-07-27 NOTE — PROGRESS NOTES
OPHTHALMOLOGY PROGRESS NOTE    Date and Time: 7/27/2021  7:30 AM    Reason For Consult: Infectious keratitis and scleritis OD    History of Present Illness: The patient is a 59 y.o. male with PMH significant for adenocarcinoma of lung on chemotherapy and myocardial infarction who was sent to the hospital after failing outpatient therapy for a corneal ulcer OD. In brief, vegetative matter hit the patient's right eye while he was weed eating last week. He was seen at the [facility] ED and discharged with recommended urgent follow up at the [address] [place] [address]. This appointment took place on 7/19, and he was immediately referred to me with a aziifw-gu-uwcuvz corneal infiltrate. Culture was obtained, which showed pansensitive Pseudomonas. In spite of appropriate treatment starting 7/19, the infection spread to the sclera. He was sent to [facility] for IV antibiotics and potential surgical intervention pending clinical course. Today he reports continuation of minimal discomfort OD.   No change in vision.    ================================================================                PHYSICAL EXAM  ================================================================  Visit Vitals  /76 (BP 1 Location: Right upper arm, BP Patient Position: At rest)   Pulse 82   Temp 98.7 °F (37.1 °C)   Resp 20   Ht 5' 6\" (1.676 m)   Wt 68.3 kg (150 lb 9.6 oz)   SpO2 97%   BMI 24.31 kg/m²     Visual Acuity: LP OD    Pupils:   OD: No view, no RAPD by reverse   OS: 4 mm to 2 mm with light, 2+ reactive, no RAPD    Intraocular Pressure: Deferred  ________________________________________________________________________    Anterior Exam:    External Exam:   OD: Mild UL ptosis, mild mucopurulence on lashes   OS: Normal    Conjunctiva:   OD: Conjunctival epi defects overlying scleritis (improved), 4+ injection, mild edema   OS: Clear    Sclera:   OD: Temporal scleritis resolved; improvement of superior, nasal, and inferior perilimbal scleritis   OS: White    Cornea:   OD: Severe rosalw-px-cpaymp infiltrate, necrosis, complete epi defect, area of crescentic thinning superiorly (Misbah negative with challenge)   OS: Clear    Anterior Chamber:   OD: No view   OS: Deep and quiet    Iris:   OD: No view   OS: Normal    Lens:   OD: PCIOL, no view   OS: 3+ brunescent NS, trace cortical    ================================================================               ASSESSMENT AND PLAN  ================================================================    1. Pansensitive Pseudomonas keratitis and scleritis OD - Area of corneal thinning superiorly, Misbah negative with challenge. Eye not soft on light palpation of sclera. Scleritis improving. Patient understands poor overall prognosis for the eye. Will delay any surgical intervention for another day. May consider cyanoacrylate to area of corneal thinning if worsens. Recommendations as follows:  - Fortified Tobramycin 15 mg/mL Q1H OD  - Moxifloxacin Q1H OD  - Fortified Vancomycin 50 mg/mL QID OD  - IV Zosyn per hospitalist service  - Doxycycline 100 mg PO BID  - Vitamin C 1,000 mg PO BID  - Okay for regular/heart-healthy diet today, NPO after midnight tonight    Thank you for including us in the patient's care. Please call with any further questions or concerns.     Von Mcfarland MD  [facility]

## 2021-07-27 NOTE — ROUTINE PROCESS
Bedside and Verbal shift change report to be given to self (oncoming nurse) by Ernestina Joaquin RN (offgoing nurse). Report included the following information SBAR, Kardex, Procedure Summary, Intake/Output and MAR.

## 2021-07-27 NOTE — ROUTINE PROCESS
Bedside and Verbal shift change report given to Chapito Lincoln RN (oncoming nurse) by self (offgoing nurse). Report included the following information SBAR, Kardex, Procedure Summary, Intake/Output, MAR and Recent Results.

## 2021-07-27 NOTE — PROGRESS NOTES
Spiritual Care Visit. Initial visit. Patient was visited by [facility]. Entered by Aggie Ge, Staff .  Holly, Stacie.

## 2021-07-28 LAB
ANION GAP SERPL CALC-SCNC: 5 MMOL/L (ref 7–16)
BASOPHILS # BLD: 0 K/UL (ref 0–0.2)
BASOPHILS NFR BLD: 1 % (ref 0–2)
BUN SERPL-MCNC: 17 MG/DL (ref 8–23)
CALCIUM SERPL-MCNC: 9.3 MG/DL (ref 8.3–10.4)
CHLORIDE SERPL-SCNC: 103 MMOL/L (ref 98–107)
CO2 SERPL-SCNC: 30 MMOL/L (ref 21–32)
CREAT SERPL-MCNC: 0.85 MG/DL (ref 0.8–1.5)
DIFFERENTIAL METHOD BLD: ABNORMAL
EOSINOPHIL # BLD: 0.1 K/UL (ref 0–0.8)
EOSINOPHIL NFR BLD: 2 % (ref 0.5–7.8)
ERYTHROCYTE [DISTWIDTH] IN BLOOD BY AUTOMATED COUNT: 14.9 % (ref 11.9–14.6)
GLUCOSE SERPL-MCNC: 100 MG/DL (ref 65–100)
HCT VFR BLD AUTO: 38 % (ref 41.1–50.3)
HGB BLD-MCNC: 12.3 G/DL (ref 13.6–17.2)
IMM GRANULOCYTES # BLD AUTO: 0.2 K/UL (ref 0–0.5)
IMM GRANULOCYTES NFR BLD AUTO: 4 % (ref 0–5)
LYMPHOCYTES # BLD: 1.6 K/UL (ref 0.5–4.6)
LYMPHOCYTES NFR BLD: 28 % (ref 13–44)
MCH RBC QN AUTO: 29.8 PG (ref 26.1–32.9)
MCHC RBC AUTO-ENTMCNC: 32.4 G/DL (ref 31.4–35)
MCV RBC AUTO: 92 FL (ref 79.6–97.8)
MONOCYTES # BLD: 0.8 K/UL (ref 0.1–1.3)
MONOCYTES NFR BLD: 14 % (ref 4–12)
NEUTS SEG # BLD: 3 K/UL (ref 1.7–8.2)
NEUTS SEG NFR BLD: 53 % (ref 43–78)
NRBC # BLD: 0 K/UL (ref 0–0.2)
PLATELET # BLD AUTO: 226 K/UL (ref 150–450)
PMV BLD AUTO: 10.9 FL (ref 9.4–12.3)
POTASSIUM SERPL-SCNC: 4 MMOL/L (ref 3.5–5.1)
RBC # BLD AUTO: 4.13 M/UL (ref 4.23–5.6)
SODIUM SERPL-SCNC: 138 MMOL/L (ref 138–145)
WBC # BLD AUTO: 5.7 K/UL (ref 4.3–11.1)

## 2021-07-28 PROCEDURE — 80048 BASIC METABOLIC PNL TOTAL CA: CPT

## 2021-07-28 PROCEDURE — 74011000258 HC RX REV CODE- 258: Performed by: FAMILY MEDICINE

## 2021-07-28 PROCEDURE — 74011250636 HC RX REV CODE- 250/636: Performed by: INTERNAL MEDICINE

## 2021-07-28 PROCEDURE — 36415 COLL VENOUS BLD VENIPUNCTURE: CPT

## 2021-07-28 PROCEDURE — 74011250636 HC RX REV CODE- 250/636: Performed by: FAMILY MEDICINE

## 2021-07-28 PROCEDURE — 74011000258 HC RX REV CODE- 258: Performed by: INTERNAL MEDICINE

## 2021-07-28 PROCEDURE — 74011250637 HC RX REV CODE- 250/637: Performed by: FAMILY MEDICINE

## 2021-07-28 PROCEDURE — 74011000250 HC RX REV CODE- 250: Performed by: FAMILY MEDICINE

## 2021-07-28 PROCEDURE — 74011250637 HC RX REV CODE- 250/637: Performed by: INTERNAL MEDICINE

## 2021-07-28 PROCEDURE — 65270000029 HC RM PRIVATE

## 2021-07-28 PROCEDURE — 85025 COMPLETE CBC W/AUTO DIFF WBC: CPT

## 2021-07-28 RX ADMIN — TOBRAMYCIN 1 DROP: 3 SOLUTION OPHTHALMIC at 07:13

## 2021-07-28 RX ADMIN — TOBRAMYCIN 1 DROP: 3 SOLUTION OPHTHALMIC at 06:09

## 2021-07-28 RX ADMIN — VANCOMYCIN HYDROCHLORIDE 1 DROP: 1 INJECTION, POWDER, LYOPHILIZED, FOR SOLUTION INTRAVENOUS at 13:07

## 2021-07-28 RX ADMIN — VANCOMYCIN HYDROCHLORIDE 1 DROP: 1 INJECTION, POWDER, LYOPHILIZED, FOR SOLUTION INTRAVENOUS at 05:09

## 2021-07-28 RX ADMIN — TOBRAMYCIN 1 DROP: 3 SOLUTION OPHTHALMIC at 15:09

## 2021-07-28 RX ADMIN — VANCOMYCIN HYDROCHLORIDE 1 DROP: 1 INJECTION, POWDER, LYOPHILIZED, FOR SOLUTION INTRAVENOUS at 19:01

## 2021-07-28 RX ADMIN — TOBRAMYCIN 1 DROP: 3 SOLUTION OPHTHALMIC at 11:15

## 2021-07-28 RX ADMIN — PIPERACILLIN SODIUM AND TAZOBACTAM SODIUM 4.5 G: 4; .5 INJECTION, POWDER, LYOPHILIZED, FOR SOLUTION INTRAVENOUS at 17:15

## 2021-07-28 RX ADMIN — TOBRAMYCIN 1 DROP: 3 SOLUTION OPHTHALMIC at 21:12

## 2021-07-28 RX ADMIN — VANCOMYCIN HYDROCHLORIDE 1 DROP: 1 INJECTION, POWDER, LYOPHILIZED, FOR SOLUTION INTRAVENOUS at 02:17

## 2021-07-28 RX ADMIN — CIPROFLOXACIN 1 DROP: 3 SOLUTION OPHTHALMIC at 05:09

## 2021-07-28 RX ADMIN — CIPROFLOXACIN 1 DROP: 3 SOLUTION OPHTHALMIC at 10:09

## 2021-07-28 RX ADMIN — CIPROFLOXACIN 1 DROP: 3 SOLUTION OPHTHALMIC at 21:12

## 2021-07-28 RX ADMIN — Medication 10 ML: at 22:19

## 2021-07-28 RX ADMIN — VANCOMYCIN HYDROCHLORIDE 1 DROP: 1 INJECTION, POWDER, LYOPHILIZED, FOR SOLUTION INTRAVENOUS at 12:04

## 2021-07-28 RX ADMIN — TOBRAMYCIN 1 DROP: 3 SOLUTION OPHTHALMIC at 01:23

## 2021-07-28 RX ADMIN — VANCOMYCIN HYDROCHLORIDE 1 DROP: 1 INJECTION, POWDER, LYOPHILIZED, FOR SOLUTION INTRAVENOUS at 06:09

## 2021-07-28 RX ADMIN — ENOXAPARIN SODIUM 40 MG: 40 INJECTION SUBCUTANEOUS at 11:22

## 2021-07-28 RX ADMIN — TOBRAMYCIN 1 DROP: 3 SOLUTION OPHTHALMIC at 08:08

## 2021-07-28 RX ADMIN — CIPROFLOXACIN 1 DROP: 3 SOLUTION OPHTHALMIC at 08:08

## 2021-07-28 RX ADMIN — CIPROFLOXACIN 1 DROP: 3 SOLUTION OPHTHALMIC at 11:14

## 2021-07-28 RX ADMIN — TOBRAMYCIN 1 DROP: 3 SOLUTION OPHTHALMIC at 00:18

## 2021-07-28 RX ADMIN — PIPERACILLIN SODIUM AND TAZOBACTAM SODIUM 4.5 G: 4; .5 INJECTION, POWDER, LYOPHILIZED, FOR SOLUTION INTRAVENOUS at 01:22

## 2021-07-28 RX ADMIN — TOBRAMYCIN 1 DROP: 3 SOLUTION OPHTHALMIC at 20:11

## 2021-07-28 RX ADMIN — VANCOMYCIN HYDROCHLORIDE 1 DROP: 1 INJECTION, POWDER, LYOPHILIZED, FOR SOLUTION INTRAVENOUS at 18:05

## 2021-07-28 RX ADMIN — CIPROFLOXACIN 1 DROP: 3 SOLUTION OPHTHALMIC at 01:23

## 2021-07-28 RX ADMIN — CIPROFLOXACIN 1 DROP: 3 SOLUTION OPHTHALMIC at 00:17

## 2021-07-28 RX ADMIN — VANCOMYCIN HYDROCHLORIDE 1 DROP: 1 INJECTION, POWDER, LYOPHILIZED, FOR SOLUTION INTRAVENOUS at 03:11

## 2021-07-28 RX ADMIN — TOBRAMYCIN 1 DROP: 3 SOLUTION OPHTHALMIC at 14:22

## 2021-07-28 RX ADMIN — DOXYCYCLINE 100 MG: 100 INJECTION, POWDER, LYOPHILIZED, FOR SOLUTION INTRAVENOUS at 20:09

## 2021-07-28 RX ADMIN — VANCOMYCIN HYDROCHLORIDE 1 DROP: 1 INJECTION, POWDER, LYOPHILIZED, FOR SOLUTION INTRAVENOUS at 04:15

## 2021-07-28 RX ADMIN — VANCOMYCIN HYDROCHLORIDE 1 DROP: 1 INJECTION, POWDER, LYOPHILIZED, FOR SOLUTION INTRAVENOUS at 00:18

## 2021-07-28 RX ADMIN — CIPROFLOXACIN 1 DROP: 3 SOLUTION OPHTHALMIC at 03:11

## 2021-07-28 RX ADMIN — VITAMIN D, TAB 1000IU (100/BT) 1000 UNITS: 25 TAB at 17:13

## 2021-07-28 RX ADMIN — CIPROFLOXACIN 1 DROP: 3 SOLUTION OPHTHALMIC at 16:18

## 2021-07-28 RX ADMIN — TOBRAMYCIN 1 DROP: 3 SOLUTION OPHTHALMIC at 19:01

## 2021-07-28 RX ADMIN — DOXYCYCLINE 100 MG: 100 INJECTION, POWDER, LYOPHILIZED, FOR SOLUTION INTRAVENOUS at 08:07

## 2021-07-28 RX ADMIN — TOBRAMYCIN 1 DROP: 3 SOLUTION OPHTHALMIC at 18:04

## 2021-07-28 RX ADMIN — CIPROFLOXACIN 1 DROP: 3 SOLUTION OPHTHALMIC at 09:25

## 2021-07-28 RX ADMIN — TOBRAMYCIN 1 DROP: 3 SOLUTION OPHTHALMIC at 16:18

## 2021-07-28 RX ADMIN — ACETAMINOPHEN 650 MG: 325 TABLET ORAL at 06:13

## 2021-07-28 RX ADMIN — TOBRAMYCIN 1 DROP: 3 SOLUTION OPHTHALMIC at 13:06

## 2021-07-28 RX ADMIN — CIPROFLOXACIN 1 DROP: 3 SOLUTION OPHTHALMIC at 19:01

## 2021-07-28 RX ADMIN — CIPROFLOXACIN 1 DROP: 3 SOLUTION OPHTHALMIC at 23:23

## 2021-07-28 RX ADMIN — VITAMIN D, TAB 1000IU (100/BT) 1000 UNITS: 25 TAB at 08:07

## 2021-07-28 RX ADMIN — CIPROFLOXACIN 1 DROP: 3 SOLUTION OPHTHALMIC at 13:05

## 2021-07-28 RX ADMIN — VANCOMYCIN HYDROCHLORIDE 1 DROP: 1 INJECTION, POWDER, LYOPHILIZED, FOR SOLUTION INTRAVENOUS at 22:18

## 2021-07-28 RX ADMIN — VANCOMYCIN HYDROCHLORIDE 1 DROP: 1 INJECTION, POWDER, LYOPHILIZED, FOR SOLUTION INTRAVENOUS at 01:23

## 2021-07-28 RX ADMIN — VANCOMYCIN HYDROCHLORIDE 1 DROP: 1 INJECTION, POWDER, LYOPHILIZED, FOR SOLUTION INTRAVENOUS at 16:18

## 2021-07-28 RX ADMIN — CIPROFLOXACIN 1 DROP: 3 SOLUTION OPHTHALMIC at 18:05

## 2021-07-28 RX ADMIN — CIPROFLOXACIN 1 DROP: 3 SOLUTION OPHTHALMIC at 06:09

## 2021-07-28 RX ADMIN — FUROSEMIDE 40 MG: 40 TABLET ORAL at 08:07

## 2021-07-28 RX ADMIN — TOBRAMYCIN 1 DROP: 3 SOLUTION OPHTHALMIC at 03:11

## 2021-07-28 RX ADMIN — PIPERACILLIN SODIUM AND TAZOBACTAM SODIUM 4.5 G: 4; .5 INJECTION, POWDER, LYOPHILIZED, FOR SOLUTION INTRAVENOUS at 10:11

## 2021-07-28 RX ADMIN — CIPROFLOXACIN 1 DROP: 3 SOLUTION OPHTHALMIC at 15:08

## 2021-07-28 RX ADMIN — SPIRONOLACTONE 25 MG: 25 TABLET ORAL at 08:08

## 2021-07-28 RX ADMIN — TOBRAMYCIN 1 DROP: 3 SOLUTION OPHTHALMIC at 05:09

## 2021-07-28 RX ADMIN — ACETAMINOPHEN 650 MG: 325 TABLET ORAL at 21:51

## 2021-07-28 RX ADMIN — TOBRAMYCIN 1 DROP: 3 SOLUTION OPHTHALMIC at 23:23

## 2021-07-28 RX ADMIN — CIPROFLOXACIN 1 DROP: 3 SOLUTION OPHTHALMIC at 12:04

## 2021-07-28 RX ADMIN — Medication 10 ML: at 06:09

## 2021-07-28 RX ADMIN — TOBRAMYCIN 1 DROP: 3 SOLUTION OPHTHALMIC at 04:15

## 2021-07-28 RX ADMIN — CIPROFLOXACIN 1 DROP: 3 SOLUTION OPHTHALMIC at 07:13

## 2021-07-28 RX ADMIN — CIPROFLOXACIN 1 DROP: 3 SOLUTION OPHTHALMIC at 22:18

## 2021-07-28 RX ADMIN — Medication 1000 MG: at 17:12

## 2021-07-28 RX ADMIN — TOBRAMYCIN 1 DROP: 3 SOLUTION OPHTHALMIC at 02:17

## 2021-07-28 RX ADMIN — CIPROFLOXACIN 1 DROP: 3 SOLUTION OPHTHALMIC at 20:09

## 2021-07-28 RX ADMIN — VANCOMYCIN HYDROCHLORIDE 1 DROP: 1 INJECTION, POWDER, LYOPHILIZED, FOR SOLUTION INTRAVENOUS at 07:13

## 2021-07-28 RX ADMIN — TOBRAMYCIN 1 DROP: 3 SOLUTION OPHTHALMIC at 22:18

## 2021-07-28 RX ADMIN — CIPROFLOXACIN 1 DROP: 3 SOLUTION OPHTHALMIC at 17:12

## 2021-07-28 RX ADMIN — CIPROFLOXACIN 1 DROP: 3 SOLUTION OPHTHALMIC at 04:15

## 2021-07-28 RX ADMIN — VANCOMYCIN HYDROCHLORIDE 1 DROP: 1 INJECTION, POWDER, LYOPHILIZED, FOR SOLUTION INTRAVENOUS at 14:22

## 2021-07-28 RX ADMIN — VANCOMYCIN HYDROCHLORIDE 1 DROP: 1 INJECTION, POWDER, LYOPHILIZED, FOR SOLUTION INTRAVENOUS at 23:24

## 2021-07-28 RX ADMIN — CIPROFLOXACIN 1 DROP: 3 SOLUTION OPHTHALMIC at 14:22

## 2021-07-28 RX ADMIN — Medication 10 ML: at 13:09

## 2021-07-28 RX ADMIN — ASPIRIN 81 MG: 81 TABLET ORAL at 11:22

## 2021-07-28 RX ADMIN — CIPROFLOXACIN 1 DROP: 3 SOLUTION OPHTHALMIC at 02:16

## 2021-07-28 RX ADMIN — TOBRAMYCIN 1 DROP: 3 SOLUTION OPHTHALMIC at 12:03

## 2021-07-28 RX ADMIN — TOBRAMYCIN 1 DROP: 3 SOLUTION OPHTHALMIC at 17:12

## 2021-07-28 RX ADMIN — VANCOMYCIN HYDROCHLORIDE 1 DROP: 1 INJECTION, POWDER, LYOPHILIZED, FOR SOLUTION INTRAVENOUS at 15:09

## 2021-07-28 RX ADMIN — VANCOMYCIN HYDROCHLORIDE 1 DROP: 1 INJECTION, POWDER, LYOPHILIZED, FOR SOLUTION INTRAVENOUS at 17:12

## 2021-07-28 RX ADMIN — Medication 1000 MG: at 08:08

## 2021-07-28 RX ADMIN — VANCOMYCIN HYDROCHLORIDE 1 DROP: 1 INJECTION, POWDER, LYOPHILIZED, FOR SOLUTION INTRAVENOUS at 21:12

## 2021-07-28 RX ADMIN — VANCOMYCIN HYDROCHLORIDE 1 DROP: 1 INJECTION, POWDER, LYOPHILIZED, FOR SOLUTION INTRAVENOUS at 20:08

## 2021-07-28 RX ADMIN — VANCOMYCIN HYDROCHLORIDE 1 DROP: 1 INJECTION, POWDER, LYOPHILIZED, FOR SOLUTION INTRAVENOUS at 11:16

## 2021-07-28 RX ADMIN — TOBRAMYCIN 1 DROP: 3 SOLUTION OPHTHALMIC at 09:26

## 2021-07-28 NOTE — PROGRESS NOTES
Pt's home medication Vancomycin eye drops are empty. Per pharmacy, we do not carry a substitution for this. Left message with Dr. Vianey Huerta office.

## 2021-07-28 NOTE — PROGRESS NOTES
Problem: Falls - Risk of  Goal: *Absence of Falls  Description: Document Princess Mi Fall Risk and appropriate interventions in the flowsheet.   Outcome: Progressing Towards Goal  Note: Fall Risk Interventions:            Medication Interventions: Evaluate medications/consider consulting pharmacy, Patient to call before getting OOB, Teach patient to arise slowly                   Problem: Patient Education: Go to Patient Education Activity  Goal: Patient/Family Education  Outcome: Progressing Towards Goal     Problem: Pain  Goal: *Control of Pain  Outcome: Progressing Towards Goal  Goal: *PALLIATIVE CARE:  Alleviation of Pain  Outcome: Progressing Towards Goal     Problem: Patient Education: Go to Patient Education Activity  Goal: Patient/Family Education  Outcome: Progressing Towards Goal

## 2021-07-28 NOTE — PROGRESS NOTES
Problem: Falls - Risk of  Goal: *Absence of Falls  Description: Document Rossy Pillow Fall Risk and appropriate interventions in the flowsheet.   Outcome: Progressing Towards Goal  Note: Fall Risk Interventions:            Medication Interventions: Patient to call before getting OOB, Teach patient to arise slowly                   Problem: Patient Education: Go to Patient Education Activity  Goal: Patient/Family Education  Outcome: Progressing Towards Goal     Problem: Pain  Goal: *Control of Pain  Outcome: Progressing Towards Goal  Goal: *PALLIATIVE CARE:  Alleviation of Pain  Outcome: Progressing Towards Goal     Problem: Patient Education: Go to Patient Education Activity  Goal: Patient/Family Education  Outcome: Progressing Towards Goal

## 2021-07-28 NOTE — PROGRESS NOTES
[facility] Hospitalist Note     Admit Date:  2021  3:18 PM   Name:  Wendi Ureña   Age:  59 y.o.  :  1960   MRN:  [id number]   PCP:  Amy Camacho MD  Treatment Team: Attending Provider: Chauncey Espinal MD; Consulting Provider: Jesika Hutson MD; Primary Nurse: Mario Dangelo; Utilization Review: Jerod Denis RN; Care Manager: Belinda Miles, RN; Care Manager: Wilber Degroot LMSW; Primary Nurse: Dhaval Sigala RN    HPI/Subjective:     Patient is a 57-year-old man with past medical history significant for CAD, adenocarcinoma of lung, on chemotherapy presented to the ED referred by ophthalmology for failed outpatient therapy for eye infection. Apparently, patient got injury to his eyes while cutting the grass. Was started on oral antibiotics and as well as eyedrops without any significant improvement and ultimately developed infective keratitis. Per report, patient has pansensitive Pseudomonas from the culture. Ophthalmologist is following and evaluating for possible surgical intervention. 2021: As per patient is feeling same. He is able to open his eyes so he feels may be slightly better. Denies any fever, chills, nausea or vomiting    Rest of the system negative except mentioned above. Assessment and Plan:     Right corneal ulcer/hypopyon:  Cultures from [facility] showed pansensitive Pseudomonas  Start patient on vancomycin/Zosyn, pharmacy consulted to adjust dose  Dr. Dinesh Santiago of [facility] eye consulted  ED spoke to Dr. Dinesh Santiago who recommend patient be n.p.o. after midnight for possible surgical debridement tomorrow  Continue tobramycin and moxifloxacin eyedrops  Continue vitamin D 1000 mg twice daily    : Still significant infection. On Zosyn 4.5 g to cover Pseudomonas. Also on vancomycin for now. On eyedrops to cover Pseudomonas as well as other infection. Dr. Dinesh Santiago will see patient today as patient has already talked to him. Patient is n.p.o.   Slow IV fluid hydration  Pain control with oxycodone. July 26: Doxycycline added to regimen as recommended by ophthalmologist.  Blood cultures negative and per ophthalmology note, his culture as outpatient showed Pseudomonas so we will discontinue vancomycin. Surgical intervention per ophthalmology. July 27: Continue eyedrops, Zosyn and doxycycline. Appreciate ophthalmology recs. Pharmacy do not have patient's eyedrop. Dr. Gris Olivas already been informed and who will evaluate the patient later. No plan for surgery today. Continue Zosyn and doxycycline along with eyedrops. CHF/CAD:  Continue Lasix,and spironolactone  Continue aspirin. Holding Plavix given possible surgery. Resumption of Plavix per ophthalmologist recommendation. July 27: As per patient, he is on Plavix for his chemotherapy he does not have any stent in his body. Will resume Plavix based on ophthalmology recommendation given possible surgery. Adenocarcinoma of lung, on chemotherapy:  Outpatient oncology follow-up    Discharge planning: Admit patient to the medical floor    DVT ppx ordered  Code status:  Full  Estimated LOS: Based on ophthalmology recommendation    Again, patient is AOx3. He wants his son Nadia Carlos to be power of  and he wants to be full code. Discussed with the patient and he would like to update family by himself. I encouraged him to asked the nurse to page me if he or his family has any questions.         Hospital Problems as of 7/28/2021 Never Reviewed        Codes Class Noted - Resolved POA    Infective scleritis of right eye ICD-10-CM: H15.091  ICD-9-CM: 379.09  7/27/2021 - Present Unknown        * (Principal) Corneal ulcer, right ICD-10-CM: H16.001  ICD-9-CM: 370.00  7/24/2021 - Present Unknown        Adenocarcinoma of lung ([facility] [address] ICD-10-CM: C34.90  ICD-9-CM: 162.9  7/24/2021 - Present Unknown        On antineoplastic chemotherapy ICD-10-CM: Z79.899  ICD-9-CM: V58.69  7/24/2021 - Present Unknown Objective:     Patient Vitals for the past 24 hrs:   Temp Pulse Resp BP SpO2   07/28/21 1125 98 °F (36.7 °C) 86 14 117/75 98 %   07/28/21 0735 98.2 °F (36.8 °C) 81 14 124/65 96 %   07/28/21 0402 98.2 °F (36.8 °C) 80 16 110/67 97 %   07/27/21 2329 98.6 °F (37 °C) 91 18 116/75 94 %   07/27/21 1915 98.3 °F (36.8 °C) 91 18 (!) 131/97    07/27/21 1900 98.3 °F (36.8 °C) 91 18 (!) 131/97 98 %     Oxygen Therapy  O2 Sat (%): 98 % (07/28/21 1125)  Pulse via Oximetry: 82 beats per minute (07/24/21 1644)  O2 Device: None (Room air) (07/28/21 0735)    Estimated body mass index is 24.31 kg/m² as calculated from the following:    Height as of this encounter: 5' 6\" (1.676 m). Weight as of this encounter: 68.3 kg (150 lb 9.6 oz). Intake/Output Summary (Last 24 hours) at 7/28/2021 1501  Last data filed at 7/28/2021 1125  Gross per 24 hour   Intake 240 ml   Output 1400 ml   Net -1160 ml       *Note that automatically entered I/Os may not be accurate; dependent on patient compliance with collection and accurate  by assistants. Physical Exam:  General:    Well nourished. Alert. Eyes:   Right eye with some discharge to the cornea, visible hypopyon and swollen injected sclera  HENT:  Normocephalic, mucous membranes slightly dry  CV:   RRR. No m/r/g. Lungs:  CTAB. No wheezing, rhonchi, or rales. Abdomen: Soft, nontender, nondistended. Extremities: Warm and dry. No cyanosis or edema. Neurologic: AOx3, moving all 4 extremities, speech normal.  Cannot see from right eye at all  Skin:     No rashes or jaundice. Normal coloration  Psych:  Normal mood and affect. I reviewed the labs, imaging, EKGs, telemetry, and other studies done this admission.   Data Reviewed:     Procedures done this admission:  * No surgery found *    All Micro Results     Procedure Component Value Units Date/Time    BLOOD CULTURE [[id number]] Collected: 07/24/21 1452    Order Status: Completed Specimen: Blood Updated: 07/28/21 0717     Special Requests: --        NO SPECIAL REQUESTS  RIGHT  FOREARM       Culture result: NO GROWTH 4 DAYS       BLOOD CULTURE [[id number]] Collected: 07/24/21 1530    Order Status: Completed Specimen: Blood Updated: 07/28/21 0717     Special Requests: --        LEFT  HAND       Culture result: NO GROWTH 4 DAYS             SARS-CoV-2 Lab Results  \"Novel Coronavirus\" Test: No results found for: COV2NT   \"Emergent Disease\" Test: No results found for: EDPR  \"SARS-COV-2\" Test: No results found for: XGCOVT  \"Precision Labs\" Test: No results found for: RSLT  Rapid Test: No results found for: COVR         Labs: Results:       BMP, Mg, Phos Recent Labs     07/28/21  0507 07/27/21  0435 07/26/21  0319    138  --    K 4.0 3.6  --     102  --    CO2 30 30  --    AGAP 5* 6*  --    BUN 17 14  --    CREA 0.85 0.69* 0.70*   CA 9.3 9.1  --     101*  --    MG  --  2.0  --    PHOS  --  3.6  --       CBC Recent Labs     07/28/21 0507 07/27/21  0436   WBC 5.7 5.3   RBC 4.13* 3.93*   HGB 12.3* 11.6*   HCT 38.0* 36.8*    220   GRANS 53 61   LYMPH 28 22   EOS 2 1   MONOS 14* 12   BASOS 1 1   IG 4 3   ANEU 3.0 3.2   ABL 1.6 1.2   CHACE 0.1 0.1   ABM 0.8 0.7   ABB 0.0 0.0   AIG 0.2 0.1      LFT No results for input(s): ALT, TBIL, AP, TP, ALB, GLOB, AGRAT in the last 72 hours.     No lab exists for component: SGOT, GPT   Cardiac Testing No results found for: BNPP, BNP, CPK, RCK1, RCK2, RCK3, RCK4, CKMB, CKNDX, CKND1, TROPT, TROIQ   Coagulation Tests No results found for: PTP, INR, APTT, INREXT, INREXT   A1c Lab Results   Component Value Date/Time    Hemoglobin A1c 5.7 07/25/2021 04:02 AM      Lipid Panel No results found for: CHOL, CHOLPOCT, CHOLX, CHLST, CHOLV, 029790, HDL, HDLP, LDL, LDLC, DLDLP, 894087, VLDLC, VLDL, TGLX, TRIGL, TRIGP, TGLPOCT, CHHD, CHHDX   Thyroid Panel No results found for: TSH, T4, FT4, TT3, T3U, TSHEXT, TSHEXT     Most Recent UA No results found for: COLOR, APPRN, REFSG, TREV, PROTU, GLUCU, KETU, BILU, BLDU, UROU, MEGHA, LEUKU, WBCU, RBCU, UEPI, BACTU, CASTS, UCRY, MUCUS, UCOM         Current Facility-Administered Medications   Medication Dose Route Frequency    ciprofloxacin HCl (CILOXAN) 0.3 % ophthalmic solution 1 Drop  1 Drop Right Eye Q1H    doxycycline (VIBRAMYCIN) 100 mg in 0.9% sodium chloride (MBP/ADV) 100 mL MBP  100 mg IntraVENous Q12H    ascorbic acid (vitamin C) (VITAMIN C) tablet 1,000 mg  1,000 mg Oral BID    oxyCODONE IR (ROXICODONE) tablet 5 mg  5 mg Oral Q4H PRN    aspirin delayed-release tablet 81 mg  81 mg Oral DAILY    sodium chloride (NS) flush 5-10 mL  5-10 mL IntraVENous Q8H    sodium chloride (NS) flush 5-10 mL  5-10 mL IntraVENous PRN    sodium chloride (NS) flush 5-40 mL  5-40 mL IntraVENous PRN    acetaminophen (TYLENOL) tablet 650 mg  650 mg Oral Q6H PRN    Or    acetaminophen (TYLENOL) suppository 650 mg  650 mg Rectal Q6H PRN    polyethylene glycol (MIRALAX) packet 17 g  17 g Oral DAILY PRN    ondansetron (ZOFRAN ODT) tablet 4 mg  4 mg Oral Q8H PRN    Or    ondansetron (ZOFRAN) injection 4 mg  4 mg IntraVENous Q6H PRN    enoxaparin (LOVENOX) injection 40 mg  40 mg SubCUTAneous DAILY    piperacillin-tazobactam (ZOSYN) 4.5 g in 0.9% sodium chloride (MBP/ADV) 100 mL MBP  4.5 g IntraVENous Q8H    spironolactone (ALDACTONE) tablet 25 mg  25 mg Oral DAILY    furosemide (LASIX) tablet 40 mg  40 mg Oral DAILY    cholecalciferol (VITAMIN D3) (1000 Units /25 mcg) tablet 1,000 Units  1,000 Units Oral BID    fortified vancomycin 50 mg/ml eye drops   1 Drop Right Eye Q1H    fortified tobramycin (NEBCIN) 15 mg/ml eye drops   1 Drop Ophthalmic Q1H       Other Studies:  No results found for this visit on 07/24/21. No results found.     SARS-CoV-2 Lab Results  \"Novel Coronavirus\" Test: No results found for: COV2NT   \"Emergent Disease\" Test: No results found for: EDPR  \"SARS-COV-2\" Test: No results found for: XGCOVT  Rapid Test: No results found for: COVR            Part of this note was written by using a voice dictation software and the note has been proof read but may still contain some grammatical/other typographical errors.     Signed:  Isaias Gaitan MD

## 2021-07-28 NOTE — PROGRESS NOTES
Non-formulary substitution:    Pt has completed home supply of Moxifloxacin from [facility]. Converting to Ciprofloxacin from [place] formulary per P&T guidelines.       Fady Veras, PharmD  Clinical Pharmacist  [phone]

## 2021-07-28 NOTE — PROGRESS NOTES
Hourly rounds performed through shift, pt denies needs at this time. Bed in low position, locked and call light/personal items within reach. Will report to night shift nurse.

## 2021-07-28 NOTE — PROGRESS NOTES
Per Dr. Nate Cote, he will call pharmacy to notify them regarding how to compound Vancomycin eye drops. Also noted that pt's home Nebcin eye drops are now empty. Per pharmacy, they can compound Nebcin eye drops and will deliver to floor when ready.

## 2021-07-29 LAB
ANION GAP SERPL CALC-SCNC: 5 MMOL/L (ref 7–16)
BACTERIA SPEC CULT: NORMAL
BACTERIA SPEC CULT: NORMAL
BASOPHILS # BLD: 0 K/UL (ref 0–0.2)
BASOPHILS NFR BLD: 1 % (ref 0–2)
BUN SERPL-MCNC: 18 MG/DL (ref 8–23)
CALCIUM SERPL-MCNC: 9.3 MG/DL (ref 8.3–10.4)
CHLORIDE SERPL-SCNC: 102 MMOL/L (ref 98–107)
CO2 SERPL-SCNC: 30 MMOL/L (ref 21–32)
CREAT SERPL-MCNC: 0.79 MG/DL (ref 0.8–1.5)
DIFFERENTIAL METHOD BLD: ABNORMAL
EOSINOPHIL # BLD: 0.1 K/UL (ref 0–0.8)
EOSINOPHIL NFR BLD: 2 % (ref 0.5–7.8)
ERYTHROCYTE [DISTWIDTH] IN BLOOD BY AUTOMATED COUNT: 15.1 % (ref 11.9–14.6)
GLUCOSE SERPL-MCNC: 96 MG/DL (ref 65–100)
HCT VFR BLD AUTO: 40 % (ref 41.1–50.3)
HGB BLD-MCNC: 13 G/DL (ref 13.6–17.2)
IMM GRANULOCYTES # BLD AUTO: 0.2 K/UL (ref 0–0.5)
IMM GRANULOCYTES NFR BLD AUTO: 3 % (ref 0–5)
LYMPHOCYTES # BLD: 1.5 K/UL (ref 0.5–4.6)
LYMPHOCYTES NFR BLD: 28 % (ref 13–44)
MCH RBC QN AUTO: 29.7 PG (ref 26.1–32.9)
MCHC RBC AUTO-ENTMCNC: 32.5 G/DL (ref 31.4–35)
MCV RBC AUTO: 91.5 FL (ref 79.6–97.8)
MONOCYTES # BLD: 0.6 K/UL (ref 0.1–1.3)
MONOCYTES NFR BLD: 12 % (ref 4–12)
NEUTS SEG # BLD: 2.9 K/UL (ref 1.7–8.2)
NEUTS SEG NFR BLD: 54 % (ref 43–78)
NRBC # BLD: 0 K/UL (ref 0–0.2)
PLATELET # BLD AUTO: 209 K/UL (ref 150–450)
PMV BLD AUTO: 10.9 FL (ref 9.4–12.3)
POTASSIUM SERPL-SCNC: 3.4 MMOL/L (ref 3.5–5.1)
RBC # BLD AUTO: 4.37 M/UL (ref 4.23–5.6)
SERVICE CMNT-IMP: NORMAL
SERVICE CMNT-IMP: NORMAL
SODIUM SERPL-SCNC: 137 MMOL/L (ref 138–145)
WBC # BLD AUTO: 5.3 K/UL (ref 4.3–11.1)

## 2021-07-29 PROCEDURE — 36415 COLL VENOUS BLD VENIPUNCTURE: CPT

## 2021-07-29 PROCEDURE — 85025 COMPLETE CBC W/AUTO DIFF WBC: CPT

## 2021-07-29 PROCEDURE — 74011250636 HC RX REV CODE- 250/636: Performed by: FAMILY MEDICINE

## 2021-07-29 PROCEDURE — 74011250637 HC RX REV CODE- 250/637: Performed by: INTERNAL MEDICINE

## 2021-07-29 PROCEDURE — 80048 BASIC METABOLIC PNL TOTAL CA: CPT

## 2021-07-29 PROCEDURE — 74011000250 HC RX REV CODE- 250: Performed by: FAMILY MEDICINE

## 2021-07-29 PROCEDURE — 74011000258 HC RX REV CODE- 258: Performed by: FAMILY MEDICINE

## 2021-07-29 PROCEDURE — 74011250636 HC RX REV CODE- 250/636: Performed by: INTERNAL MEDICINE

## 2021-07-29 PROCEDURE — 74011250637 HC RX REV CODE- 250/637: Performed by: FAMILY MEDICINE

## 2021-07-29 PROCEDURE — 65270000029 HC RM PRIVATE

## 2021-07-29 RX ORDER — POTASSIUM CHLORIDE 14.9 MG/ML
20 INJECTION INTRAVENOUS
Status: COMPLETED | OUTPATIENT
Start: 2021-07-29 | End: 2021-07-29

## 2021-07-29 RX ORDER — DOXYCYCLINE 100 MG/1
100 CAPSULE ORAL EVERY 12 HOURS
Status: DISCONTINUED | OUTPATIENT
Start: 2021-07-29 | End: 2021-08-01 | Stop reason: HOSPADM

## 2021-07-29 RX ORDER — DOXYCYCLINE 100 MG/1
100 CAPSULE ORAL EVERY 12 HOURS
Status: DISCONTINUED | OUTPATIENT
Start: 2021-07-29 | End: 2021-07-29

## 2021-07-29 RX ADMIN — TOBRAMYCIN 1 DROP: 3 SOLUTION OPHTHALMIC at 11:13

## 2021-07-29 RX ADMIN — CIPROFLOXACIN 1 DROP: 3 SOLUTION OPHTHALMIC at 06:16

## 2021-07-29 RX ADMIN — CIPROFLOXACIN 1 DROP: 3 SOLUTION OPHTHALMIC at 19:16

## 2021-07-29 RX ADMIN — TOBRAMYCIN 1 DROP: 3 SOLUTION OPHTHALMIC at 09:11

## 2021-07-29 RX ADMIN — CIPROFLOXACIN 1 DROP: 3 SOLUTION OPHTHALMIC at 09:11

## 2021-07-29 RX ADMIN — Medication 10 ML: at 22:18

## 2021-07-29 RX ADMIN — VITAMIN D, TAB 1000IU (100/BT) 1000 UNITS: 25 TAB at 08:33

## 2021-07-29 RX ADMIN — TOBRAMYCIN 1 DROP: 3 SOLUTION OPHTHALMIC at 14:38

## 2021-07-29 RX ADMIN — TOBRAMYCIN 1 DROP: 3 SOLUTION OPHTHALMIC at 06:57

## 2021-07-29 RX ADMIN — CIPROFLOXACIN 1 DROP: 3 SOLUTION OPHTHALMIC at 05:15

## 2021-07-29 RX ADMIN — CIPROFLOXACIN 1 DROP: 3 SOLUTION OPHTHALMIC at 04:23

## 2021-07-29 RX ADMIN — CIPROFLOXACIN 1 DROP: 3 SOLUTION OPHTHALMIC at 00:18

## 2021-07-29 RX ADMIN — DOXYCYCLINE HYCLATE 100 MG: 100 CAPSULE ORAL at 08:33

## 2021-07-29 RX ADMIN — CIPROFLOXACIN 1 DROP: 3 SOLUTION OPHTHALMIC at 02:17

## 2021-07-29 RX ADMIN — TOBRAMYCIN 1 DROP: 3 SOLUTION OPHTHALMIC at 05:15

## 2021-07-29 RX ADMIN — CIPROFLOXACIN 1 DROP: 3 SOLUTION OPHTHALMIC at 06:57

## 2021-07-29 RX ADMIN — CIPROFLOXACIN 1 DROP: 3 SOLUTION OPHTHALMIC at 01:17

## 2021-07-29 RX ADMIN — CIPROFLOXACIN 1 DROP: 3 SOLUTION OPHTHALMIC at 22:18

## 2021-07-29 RX ADMIN — TOBRAMYCIN 1 DROP: 3 SOLUTION OPHTHALMIC at 19:16

## 2021-07-29 RX ADMIN — Medication 1000 MG: at 17:24

## 2021-07-29 RX ADMIN — CIPROFLOXACIN 1 DROP: 3 SOLUTION OPHTHALMIC at 11:14

## 2021-07-29 RX ADMIN — TOBRAMYCIN 1 DROP: 3 SOLUTION OPHTHALMIC at 17:31

## 2021-07-29 RX ADMIN — ASPIRIN 81 MG: 81 TABLET ORAL at 08:32

## 2021-07-29 RX ADMIN — CIPROFLOXACIN 1 DROP: 3 SOLUTION OPHTHALMIC at 20:12

## 2021-07-29 RX ADMIN — FUROSEMIDE 40 MG: 40 TABLET ORAL at 08:33

## 2021-07-29 RX ADMIN — CIPROFLOXACIN 1 DROP: 3 SOLUTION OPHTHALMIC at 09:05

## 2021-07-29 RX ADMIN — Medication 1000 MG: at 08:33

## 2021-07-29 RX ADMIN — CIPROFLOXACIN 1 DROP: 3 SOLUTION OPHTHALMIC at 13:28

## 2021-07-29 RX ADMIN — TOBRAMYCIN 1 DROP: 3 SOLUTION OPHTHALMIC at 09:05

## 2021-07-29 RX ADMIN — TOBRAMYCIN 1 DROP: 3 SOLUTION OPHTHALMIC at 08:29

## 2021-07-29 RX ADMIN — TOBRAMYCIN 1 DROP: 3 SOLUTION OPHTHALMIC at 06:17

## 2021-07-29 RX ADMIN — CIPROFLOXACIN 1 DROP: 3 SOLUTION OPHTHALMIC at 14:38

## 2021-07-29 RX ADMIN — POTASSIUM CHLORIDE 20 MEQ: 14.9 INJECTION, SOLUTION INTRAVENOUS at 12:07

## 2021-07-29 RX ADMIN — CIPROFLOXACIN 1 DROP: 3 SOLUTION OPHTHALMIC at 16:48

## 2021-07-29 RX ADMIN — TOBRAMYCIN 1 DROP: 3 SOLUTION OPHTHALMIC at 21:18

## 2021-07-29 RX ADMIN — TOBRAMYCIN 1 DROP: 3 SOLUTION OPHTHALMIC at 00:18

## 2021-07-29 RX ADMIN — VITAMIN D, TAB 1000IU (100/BT) 1000 UNITS: 25 TAB at 17:24

## 2021-07-29 RX ADMIN — PIPERACILLIN SODIUM AND TAZOBACTAM SODIUM 4.5 G: 4; .5 INJECTION, POWDER, LYOPHILIZED, FOR SOLUTION INTRAVENOUS at 11:12

## 2021-07-29 RX ADMIN — TOBRAMYCIN 1 DROP: 3 SOLUTION OPHTHALMIC at 13:28

## 2021-07-29 RX ADMIN — TOBRAMYCIN 1 DROP: 3 SOLUTION OPHTHALMIC at 16:46

## 2021-07-29 RX ADMIN — PIPERACILLIN SODIUM AND TAZOBACTAM SODIUM 4.5 G: 4; .5 INJECTION, POWDER, LYOPHILIZED, FOR SOLUTION INTRAVENOUS at 01:52

## 2021-07-29 RX ADMIN — Medication 10 ML: at 05:19

## 2021-07-29 RX ADMIN — CIPROFLOXACIN 1 DROP: 3 SOLUTION OPHTHALMIC at 03:13

## 2021-07-29 RX ADMIN — PIPERACILLIN SODIUM AND TAZOBACTAM SODIUM 4.5 G: 4; .5 INJECTION, POWDER, LYOPHILIZED, FOR SOLUTION INTRAVENOUS at 17:23

## 2021-07-29 RX ADMIN — CIPROFLOXACIN 1 DROP: 3 SOLUTION OPHTHALMIC at 17:31

## 2021-07-29 RX ADMIN — CIPROFLOXACIN 1 DROP: 3 SOLUTION OPHTHALMIC at 08:29

## 2021-07-29 RX ADMIN — TOBRAMYCIN 1 DROP: 3 SOLUTION OPHTHALMIC at 02:17

## 2021-07-29 RX ADMIN — TOBRAMYCIN 1 DROP: 3 SOLUTION OPHTHALMIC at 12:09

## 2021-07-29 RX ADMIN — TOBRAMYCIN 1 DROP: 3 SOLUTION OPHTHALMIC at 01:16

## 2021-07-29 RX ADMIN — CIPROFLOXACIN 1 DROP: 3 SOLUTION OPHTHALMIC at 12:09

## 2021-07-29 RX ADMIN — Medication 10 ML: at 13:24

## 2021-07-29 RX ADMIN — ACETAMINOPHEN 650 MG: 325 TABLET ORAL at 15:54

## 2021-07-29 RX ADMIN — SPIRONOLACTONE 25 MG: 25 TABLET ORAL at 08:32

## 2021-07-29 RX ADMIN — TOBRAMYCIN 1 DROP: 3 SOLUTION OPHTHALMIC at 20:12

## 2021-07-29 RX ADMIN — TOBRAMYCIN 1 DROP: 3 SOLUTION OPHTHALMIC at 22:17

## 2021-07-29 RX ADMIN — POTASSIUM CHLORIDE 20 MEQ: 14.9 INJECTION, SOLUTION INTRAVENOUS at 08:55

## 2021-07-29 RX ADMIN — TOBRAMYCIN 1 DROP: 3 SOLUTION OPHTHALMIC at 04:24

## 2021-07-29 RX ADMIN — CIPROFLOXACIN 1 DROP: 3 SOLUTION OPHTHALMIC at 15:52

## 2021-07-29 RX ADMIN — CIPROFLOXACIN 1 DROP: 3 SOLUTION OPHTHALMIC at 21:18

## 2021-07-29 RX ADMIN — DOXYCYCLINE HYCLATE 100 MG: 100 CAPSULE ORAL at 21:17

## 2021-07-29 RX ADMIN — CIPROFLOXACIN 1 DROP: 3 SOLUTION OPHTHALMIC at 23:12

## 2021-07-29 RX ADMIN — ENOXAPARIN SODIUM 40 MG: 40 INJECTION SUBCUTANEOUS at 08:33

## 2021-07-29 RX ADMIN — TOBRAMYCIN 1 DROP: 3 SOLUTION OPHTHALMIC at 16:47

## 2021-07-29 RX ADMIN — TOBRAMYCIN 1 DROP: 3 SOLUTION OPHTHALMIC at 15:52

## 2021-07-29 RX ADMIN — TOBRAMYCIN 1 DROP: 3 SOLUTION OPHTHALMIC at 03:12

## 2021-07-29 RX ADMIN — CIPROFLOXACIN 1 DROP: 3 SOLUTION OPHTHALMIC at 16:47

## 2021-07-29 RX ADMIN — TOBRAMYCIN 1 DROP: 3 SOLUTION OPHTHALMIC at 23:13

## 2021-07-29 NOTE — PROGRESS NOTES
Patient resting in room. No signs or symptoms of distress. No changes in status. Patient denies any further needs or pain at this time. Bed in low position and call light/ personal items within reach. Will continue to monitor and give bedside shift report to oncoming day shift nurse.

## 2021-07-29 NOTE — PROGRESS NOTES
INITIAL SPIRITUAL ASSESSMENT     NOTED:    NO JERRI PREFERENCE LISTED    SON - MELL    DAUGHTER -  JEWEL    FULL CODE    NO ACP ON FILE    EXP D/C  4  DAYS      WILL ASSESS HOW WE CAN BEST SERVE THIS FAMILY

## 2021-07-29 NOTE — PROGRESS NOTES
[employer] Hospitalist Note     Admit Date:  2021  3:18 PM   Name:  Alina Chavez   Age:  59 y.o.  :  1960   MRN:  [id number]   PCP:  June Macias MD  Treatment Team: Attending Provider: Chris Veras DO; Consulting Provider: Albert Mcclelland MD; Primary Nurse: Blanco Olivera; Utilization Review: Kristine Barens RN; Care Manager: Kari Guillory RN; Care Manager: Jermaine Lantigua LMSW    HPI/Subjective:     Patient is a 57-year-old man with past medical history significant for CAD, adenocarcinoma of lung, on chemotherapy presented to the ED referred by ophthalmology for failed outpatient therapy for eye infection. Apparently, patient got injury to his eyes while cutting the grass. Was started on oral antibiotics and as well as eyedrops without any significant improvement and ultimately developed infective keratitis. Per report, patient has pansensitive Pseudomonas from the culture. Ophthalmologist is following and evaluating for possible surgical intervention. 2021: As per patient is feeling same. He is able to open his eyes so he feels may be slightly better. Denies any fever, chills, nausea or vomiting    Rest of the system negative except mentioned above. Assessment and Plan:     Pansensitive Pseudomonas keratitis and scleritis OD:  Cultures from [facility] showed pansensitive Pseudomonas  Start patient on vancomycin/Zosyn, pharmacy consulted to adjust dose  Dr. Arturo Fernandez of [facility] eye consulted  ED spoke to Dr. Arturo Fernandez who recommend patient be n.p.o. after midnight for possible surgical debridement tomorrow  Continue tobramycin and moxifloxacin eyedrops  Continue vitamin D 1000 mg twice daily  -ophthalmology consulted and managing. Patient noted to have cultures that are pansensitive question of patient would be appropriate for transition to oral Cipro. Will consult infectious disease in a.m. Patient noted to have failed outpatient therapy.   Antibiotic regimen is as below  -Fortified Tobramycin 15 mg/mL Q1H OD  -Ciprofloxacin Q1H OD  - Fortified Vancomycin 50 mg/mL QID OD  - IV Zosyn per hospitalist service  - Doxycycline 100 mg PO BID  - Vitamin C 1,000 mg PO BID    CHF/CAD:  Continue Lasix,and spironolactone  Continue aspirin. Holding Plavix given possible surgery. July 27: As per patient, he is on Plavix for his chemotherapy he does not have any stent in his body. Will resume Plavix based on ophthalmology recommendation given possible surgery. 7/29-stable with no acute process at this time. Patient's home Lasix spironolactone and aspirin have been continued.         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  ICD-9-CM: 379.09  7/27/2021 - Present Unknown        * (Principal) Corneal ulcer, right ICD-10-CM: H16.001  ICD-9-CM: 370.00  7/24/2021 - Present Unknown        Adenocarcinoma of lung ([facility] [address] ICD-10-CM: C34.90  ICD-9-CM: 162.9  7/24/2021 - Present Unknown        On antineoplastic chemotherapy ICD-10-CM: Z79.899  ICD-9-CM: V58.69  7/24/2021 - Present Unknown                    Objective:     Patient Vitals for the past 24 hrs:   Temp Pulse Resp BP SpO2   07/29/21 1140 97.1 °F (36.2 °C) 89 17 132/70 96 %   07/29/21 0735 97.7 °F (36.5 °C) 80 17 127/79 96 %   07/29/21 0322 97.9 °F (36.6 °C) 83 16 116/74 98 %   07/28/21 2332 98.3 °F (36.8 °C) 96 16 107/65 97 %   07/28/21 2015 98.6 °F (37 °C) 96 16 123/75 96 %   07/28/21 1752 98.9 °F (37.2 °C) (!) 101 16 136/76 96 %     Oxygen Therapy  O2 Sat (%): 96 % (07/29/21 1140)  Pulse via Oximetry: 82 beats per minute (07/24/21 1644)  O2 Device: None (Room air) (07/28/21 1924)    Estimated body mass index is 24.36 kg/m² as calculated from the following:    Height as of this encounter: 5' 6\" (1.676 m). Weight as of this encounter: 68.4 kg (150 lb 14.4 oz). Intake/Output Summary (Last 24 hours) at 7/29/2021 1331  Last data filed at 7/29/2021 2234  Gross per 24 hour   Intake 240 ml   Output 300 ml   Net -60 ml       *Note that automatically entered I/Os may not be accurate; dependent on patient compliance with collection and accurate  by assistants. Physical Exam:  General:    Well nourished. Alert. Eyes:   Right eye with some discharge to the cornea, visible hypopyon and swollen injected sclera  HENT:  Normocephalic, mucous membranes slightly dry  CV:   RRR. No m/r/g. Lungs:  CTAB. No wheezing, rhonchi, or rales. Abdomen: Soft, nontender, nondistended. Extremities: Warm and dry. No cyanosis or edema. Neurologic: AOx3, moving all 4 extremities, speech normal.  Cannot see from right eye at all  Skin:     No rashes or jaundice. Normal coloration  Psych:  Normal mood and affect. I reviewed the labs, imaging, EKGs, telemetry, and other studies done this admission.   Data Reviewed:     Procedures done this admission:  * No surgery found *    All Micro Results     Procedure Component Value Units Date/Time    BLOOD CULTURE [[id number]] Collected: 07/24/21 1452    Order Status: Completed Specimen: Blood Updated: 07/29/21 0708     Special Requests: --        NO SPECIAL REQUESTS  RIGHT  FOREARM       Culture result: NO GROWTH 5 DAYS       BLOOD CULTURE [[id number]] Collected: 07/24/21 1530    Order Status: Completed Specimen: Blood Updated: 07/29/21 0708     Special Requests: --        LEFT  HAND       Culture result: NO GROWTH 5 DAYS             SARS-CoV-2 Lab Results  \"Novel Coronavirus\" Test: No results found for: COV2NT   \"Emergent Disease\" Test: No results found for: EDPR  \"SARS-COV-2\" Test: No results found for: XGCOVT  \"Precision Labs\" Test: No results found for: RSLT  Rapid Test: No results found for: COVR         Labs: Results:       BMP, Mg, Phos Recent Labs     07/29/21  0436 07/28/21  0507 07/27/21  0435   * 138 138   K 3.4* 4.0 3.6    103 102   CO2 30 30 30   AGAP 5* 5* 6*   BUN 18 17 14   CREA 0.79* 0.85 0.69*   CA 9.3 9.3 9.1   GLU 96 100 101*   MG  --   --  2.0   PHOS  --   --  3.6      CBC Recent Labs     07/29/21 0436 07/28/21  0507 07/27/21  0436   WBC 5.3 5.7 5.3   RBC 4.37 4.13* 3.93*   HGB 13.0* 12.3* 11.6*   HCT 40.0* 38.0* 36.8*    226 220   GRANS 54 53 61   LYMPH 28 28 22   EOS 2 2 1   MONOS 12 14* 12   BASOS 1 1 1   IG 3 4 3   ANEU 2.9 3.0 3.2   ABL 1.5 1.6 1.2   CHACE 0.1 0.1 0.1   ABM 0.6 0.8 0.7   ABB 0.0 0.0 0.0   AIG 0.2 0.2 0.1      LFT No results for input(s): ALT, TBIL, AP, TP, ALB, GLOB, AGRAT in the last 72 hours.     No lab exists for component: SGOT, GPT   Cardiac Testing No results found for: BNPP, BNP, CPK, RCK1, RCK2, RCK3, RCK4, CKMB, CKNDX, CKND1, TROPT, TROIQ   Coagulation Tests No results found for: PTP, INR, APTT, INREXT, INREXT   A1c Lab Results   Component Value Date/Time    Hemoglobin A1c 5.7 07/25/2021 04:02 AM      Lipid Panel No results found for: CHOL, CHOLPOCT, CHOLX, CHLST, CHOLV, 916673, HDL, HDLP, LDL, LDLC, DLDLP, 860177, VLDLC, VLDL, TGLX, TRIGL, TRIGP, TGLPOCT, CHHD, CHHDX   Thyroid Panel No results found for: TSH, T4, FT4, TT3, T3U, TSHEXT, TSHEXT     Most Recent UA No results found for: COLOR, APPRN, REFSG, TREV, PROTU, GLUCU, KETU, BILU, BLDU, UROU, MEGHA, LEUKU, WBCU, RBCU, UEPI, BACTU, CASTS, UCRY, MUCUS, UCOM         Current Facility-Administered Medications   Medication Dose Route Frequency    doxycycline (VIBRAMYCIN) capsule 100 mg  100 mg Oral Q12H    potassium chloride 20 mEq in 100 ml IVPB  20 mEq IntraVENous Q2H    fortified vancomycin 50 mg/ml eye drops   1 Drop Right Eye QID    ciprofloxacin HCl (CILOXAN) 0.3 % ophthalmic solution 1 Drop  1 Drop Right Eye Q1H    ascorbic acid (vitamin C) (VITAMIN C) tablet 1,000 mg  1,000 mg Oral BID    oxyCODONE IR (ROXICODONE) tablet 5 mg  5 mg Oral Q4H PRN    aspirin delayed-release tablet 81 mg  81 mg Oral DAILY    sodium chloride (NS) flush 5-10 mL  5-10 mL IntraVENous Q8H    sodium chloride (NS) flush 5-10 mL  5-10 mL IntraVENous PRN    sodium chloride (NS) flush 5-40 mL  5-40 mL IntraVENous PRN    acetaminophen (TYLENOL) tablet 650 mg  650 mg Oral Q6H PRN    Or    acetaminophen (TYLENOL) suppository 650 mg  650 mg Rectal Q6H PRN    polyethylene glycol (MIRALAX) packet 17 g  17 g Oral DAILY PRN    ondansetron (ZOFRAN ODT) tablet 4 mg  4 mg Oral Q8H PRN    Or    ondansetron (ZOFRAN) injection 4 mg  4 mg IntraVENous Q6H PRN    enoxaparin (LOVENOX) injection 40 mg  40 mg SubCUTAneous DAILY    piperacillin-tazobactam (ZOSYN) 4.5 g in 0.9% sodium chloride (MBP/ADV) 100 mL MBP  4.5 g IntraVENous Q8H    spironolactone (ALDACTONE) tablet 25 mg  25 mg Oral DAILY    furosemide (LASIX) tablet 40 mg  40 mg Oral DAILY    cholecalciferol (VITAMIN D3) (1000 Units /25 mcg) tablet 1,000 Units  1,000 Units Oral BID    fortified tobramycin (NEBCIN) 15 mg/ml eye drops   1 Drop Ophthalmic Q1H       Other Studies:  No results found for this visit on 07/24/21. No results found. SARS-CoV-2 Lab Results  \"Novel Coronavirus\" Test: No results found for: COV2NT   \"Emergent Disease\" Test: No results found for: EDPR  \"SARS-COV-2\" Test: No results found for: XGCOVT  Rapid Test: No results found for: COVR            Part of this note was written by using a voice dictation software and the note has been proof read but may still contain some grammatical/other typographical errors.     Signed:  Zahra Clemons DO

## 2021-07-29 NOTE — PROGRESS NOTES
OPHTHALMOLOGY PROGRESS NOTE    Date and Time: 7/27/2021  6:30 PM    Reason For Consult: Infectious keratitis and scleritis OD    History of Present Illness: The patient is a 59 y.o. male with PMH significant for adenocarcinoma of lung on chemotherapy and myocardial infarction who was sent to the hospital after failing outpatient therapy for a corneal ulcer OD. In brief, vegetative matter hit the patient's right eye while he was weed eating last week. He was seen at the [facility] ED and discharged with recommended urgent follow up at the [address] [place] [address]. This appointment took place on 7/19, and he was immediately referred to me with a afrooe-rx-[id number] corneal infiltrate. Culture was obtained, which showed pansensitive Pseudomonas. In spite of appropriate treatment starting 7/19, the infection spread to the sclera. He was sent to [facility] for IV antibiotics and potential surgical intervention pending clinical course. Today he reports continuation of minimal discomfort OD.   No change in vision.    ================================================================                PHYSICAL EXAM  ================================================================  Visit Vitals  /75   Pulse 96   Temp 98.6 °F (37 °C)   Resp 16   Ht 5' 6\" (1.676 m)   Wt 68.3 kg (150 lb 9.6 oz)   SpO2 96%   BMI 24.31 kg/m²     Visual Acuity: LP OD    Pupils:   OD: No view, no RAPD by reverse   OS: 4 mm to 2 mm with light, 2+ reactive, no RAPD    Intraocular Pressure: Deferred  ________________________________________________________________________    Anterior Exam:    External Exam:   OD: Mild UL ptosis, mild mucopurulence on lashes   OS: Normal    Conjunctiva:   OD: Conjunctival epi defects overlying scleritis (improved), 4+ injection, mild edema   OS: Clear    Sclera:   OD: Superior and temporal scleritis resolved, 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 Pansensitive Pseudomonas keratitis and scleritis OD - Scleritis continuing to improve. Now some epithelialization over areas of prior scleritis and superior cornea. Area of corneal thinning superiorly, Misbah negative with challenge. Eye not soft on light palpation of sclera. Patient understands poor overall prognosis for the eye. Will delay any surgical intervention for another day. May consider cyanoacrylate to area of corneal thinning if worsens. Recommendations as follows:  - Fortified Tobramycin 15 mg/mL Q1H OD  - Moxifloxacin Q1H OD  - Fortified Vancomycin 50 mg/mL QID OD  - IV Zosyn per hospitalist service  - Doxycycline 100 mg PO BID  - Vitamin C 1,000 mg PO BID  - Okay for regular/heart-healthy diet    Thank you for including us in the patient's care. Please call with any further questions or concerns.     Clinton Betancur MD  [facility]

## 2021-07-30 LAB
ANION GAP SERPL CALC-SCNC: 6 MMOL/L (ref 7–16)
BASOPHILS # BLD: 0.1 K/UL (ref 0–0.2)
BASOPHILS NFR BLD: 1 % (ref 0–2)
BUN SERPL-MCNC: 19 MG/DL (ref 8–23)
CALCIUM SERPL-MCNC: 9.1 MG/DL (ref 8.3–10.4)
CHLORIDE SERPL-SCNC: 103 MMOL/L (ref 98–107)
CO2 SERPL-SCNC: 29 MMOL/L (ref 21–32)
CREAT SERPL-MCNC: 0.77 MG/DL (ref 0.8–1.5)
DIFFERENTIAL METHOD BLD: ABNORMAL
EOSINOPHIL # BLD: 0.1 K/UL (ref 0–0.8)
EOSINOPHIL NFR BLD: 1 % (ref 0.5–7.8)
ERYTHROCYTE [DISTWIDTH] IN BLOOD BY AUTOMATED COUNT: 14.8 % (ref 11.9–14.6)
GLUCOSE SERPL-MCNC: 93 MG/DL (ref 65–100)
HCT VFR BLD AUTO: 38.5 % (ref 41.1–50.3)
HGB BLD-MCNC: 12.5 G/DL (ref 13.6–17.2)
IMM GRANULOCYTES # BLD AUTO: 0.1 K/UL (ref 0–0.5)
IMM GRANULOCYTES NFR BLD AUTO: 2 % (ref 0–5)
LYMPHOCYTES # BLD: 1.4 K/UL (ref 0.5–4.6)
LYMPHOCYTES NFR BLD: 24 % (ref 13–44)
MCH RBC QN AUTO: 29.8 PG (ref 26.1–32.9)
MCHC RBC AUTO-ENTMCNC: 32.5 G/DL (ref 31.4–35)
MCV RBC AUTO: 91.9 FL (ref 79.6–97.8)
MONOCYTES # BLD: 0.6 K/UL (ref 0.1–1.3)
MONOCYTES NFR BLD: 10 % (ref 4–12)
NEUTS SEG # BLD: 3.6 K/UL (ref 1.7–8.2)
NEUTS SEG NFR BLD: 61 % (ref 43–78)
NRBC # BLD: 0 K/UL (ref 0–0.2)
PLATELET # BLD AUTO: 195 K/UL (ref 150–450)
PMV BLD AUTO: 10.6 FL (ref 9.4–12.3)
POTASSIUM SERPL-SCNC: 3.4 MMOL/L (ref 3.5–5.1)
RBC # BLD AUTO: 4.19 M/UL (ref 4.23–5.6)
SODIUM SERPL-SCNC: 138 MMOL/L (ref 136–145)
WBC # BLD AUTO: 5.8 K/UL (ref 4.3–11.1)

## 2021-07-30 PROCEDURE — 74011000258 HC RX REV CODE- 258: Performed by: FAMILY MEDICINE

## 2021-07-30 PROCEDURE — 85025 COMPLETE CBC W/AUTO DIFF WBC: CPT

## 2021-07-30 PROCEDURE — 80048 BASIC METABOLIC PNL TOTAL CA: CPT

## 2021-07-30 PROCEDURE — 74011250637 HC RX REV CODE- 250/637: Performed by: INTERNAL MEDICINE

## 2021-07-30 PROCEDURE — 74011250637 HC RX REV CODE- 250/637: Performed by: NURSE PRACTITIONER

## 2021-07-30 PROCEDURE — 74011000250 HC RX REV CODE- 250: Performed by: FAMILY MEDICINE

## 2021-07-30 PROCEDURE — 65270000029 HC RM PRIVATE

## 2021-07-30 PROCEDURE — 74011250636 HC RX REV CODE- 250/636: Performed by: FAMILY MEDICINE

## 2021-07-30 PROCEDURE — 74011250637 HC RX REV CODE- 250/637: Performed by: FAMILY MEDICINE

## 2021-07-30 PROCEDURE — 36415 COLL VENOUS BLD VENIPUNCTURE: CPT

## 2021-07-30 RX ORDER — CIPROFLOXACIN 500 MG/1
750 TABLET ORAL EVERY 12 HOURS
Status: DISCONTINUED | OUTPATIENT
Start: 2021-07-30 | End: 2021-08-01 | Stop reason: HOSPADM

## 2021-07-30 RX ADMIN — FUROSEMIDE 40 MG: 40 TABLET ORAL at 08:29

## 2021-07-30 RX ADMIN — CIPROFLOXACIN 1 DROP: 3 SOLUTION OPHTHALMIC at 02:05

## 2021-07-30 RX ADMIN — TOBRAMYCIN 1 DROP: 3 SOLUTION OPHTHALMIC at 08:30

## 2021-07-30 RX ADMIN — TOBRAMYCIN 1 DROP: 3 SOLUTION OPHTHALMIC at 05:26

## 2021-07-30 RX ADMIN — TOBRAMYCIN 1 DROP: 3 SOLUTION OPHTHALMIC at 17:21

## 2021-07-30 RX ADMIN — CIPROFLOXACIN 1 DROP: 3 SOLUTION OPHTHALMIC at 21:27

## 2021-07-30 RX ADMIN — Medication 5 ML: at 14:00

## 2021-07-30 RX ADMIN — TOBRAMYCIN 1 DROP: 3 SOLUTION OPHTHALMIC at 20:41

## 2021-07-30 RX ADMIN — TOBRAMYCIN 1 DROP: 3 SOLUTION OPHTHALMIC at 00:11

## 2021-07-30 RX ADMIN — DOXYCYCLINE HYCLATE 100 MG: 100 CAPSULE ORAL at 08:29

## 2021-07-30 RX ADMIN — CIPROFLOXACIN 1 DROP: 3 SOLUTION OPHTHALMIC at 04:20

## 2021-07-30 RX ADMIN — TOBRAMYCIN 1 DROP: 3 SOLUTION OPHTHALMIC at 12:40

## 2021-07-30 RX ADMIN — CIPROFLOXACIN 1 DROP: 3 SOLUTION OPHTHALMIC at 12:40

## 2021-07-30 RX ADMIN — TOBRAMYCIN 1 DROP: 3 SOLUTION OPHTHALMIC at 01:07

## 2021-07-30 RX ADMIN — CIPROFLOXACIN 1 DROP: 3 SOLUTION OPHTHALMIC at 23:01

## 2021-07-30 RX ADMIN — CIPROFLOXACIN 1 DROP: 3 SOLUTION OPHTHALMIC at 09:28

## 2021-07-30 RX ADMIN — Medication 1000 MG: at 17:20

## 2021-07-30 RX ADMIN — TOBRAMYCIN 1 DROP: 3 SOLUTION OPHTHALMIC at 23:53

## 2021-07-30 RX ADMIN — CIPROFLOXACIN 1 DROP: 3 SOLUTION OPHTHALMIC at 00:11

## 2021-07-30 RX ADMIN — TOBRAMYCIN 1 DROP: 3 SOLUTION OPHTHALMIC at 15:40

## 2021-07-30 RX ADMIN — TOBRAMYCIN 1 DROP: 3 SOLUTION OPHTHALMIC at 13:40

## 2021-07-30 RX ADMIN — TOBRAMYCIN 1 DROP: 3 SOLUTION OPHTHALMIC at 09:29

## 2021-07-30 RX ADMIN — CIPROFLOXACIN 1 DROP: 3 SOLUTION OPHTHALMIC at 18:09

## 2021-07-30 RX ADMIN — TOBRAMYCIN 1 DROP: 3 SOLUTION OPHTHALMIC at 23:01

## 2021-07-30 RX ADMIN — TOBRAMYCIN 1 DROP: 3 SOLUTION OPHTHALMIC at 07:00

## 2021-07-30 RX ADMIN — CIPROFLOXACIN 1 DROP: 3 SOLUTION OPHTHALMIC at 10:00

## 2021-07-30 RX ADMIN — TOBRAMYCIN 1 DROP: 3 SOLUTION OPHTHALMIC at 06:21

## 2021-07-30 RX ADMIN — CIPROFLOXACIN 1 DROP: 3 SOLUTION OPHTHALMIC at 08:29

## 2021-07-30 RX ADMIN — ASPIRIN 81 MG: 81 TABLET ORAL at 08:29

## 2021-07-30 RX ADMIN — TOBRAMYCIN 1 DROP: 3 SOLUTION OPHTHALMIC at 11:00

## 2021-07-30 RX ADMIN — CIPROFLOXACIN 1 DROP: 3 SOLUTION OPHTHALMIC at 05:26

## 2021-07-30 RX ADMIN — TOBRAMYCIN 1 DROP: 3 SOLUTION OPHTHALMIC at 03:12

## 2021-07-30 RX ADMIN — CIPROFLOXACIN 1 DROP: 3 SOLUTION OPHTHALMIC at 19:13

## 2021-07-30 RX ADMIN — CIPROFLOXACIN 1 DROP: 3 SOLUTION OPHTHALMIC at 07:00

## 2021-07-30 RX ADMIN — Medication 10 ML: at 05:25

## 2021-07-30 RX ADMIN — CIPROFLOXACIN 1 DROP: 3 SOLUTION OPHTHALMIC at 01:07

## 2021-07-30 RX ADMIN — CIPROFLOXACIN 1 DROP: 3 SOLUTION OPHTHALMIC at 13:40

## 2021-07-30 RX ADMIN — CIPROFLOXACIN 1 DROP: 3 SOLUTION OPHTHALMIC at 03:12

## 2021-07-30 RX ADMIN — VITAMIN D, TAB 1000IU (100/BT) 1000 UNITS: 25 TAB at 17:20

## 2021-07-30 RX ADMIN — SPIRONOLACTONE 25 MG: 25 TABLET ORAL at 08:29

## 2021-07-30 RX ADMIN — TOBRAMYCIN 1 DROP: 3 SOLUTION OPHTHALMIC at 14:40

## 2021-07-30 RX ADMIN — CIPROFLOXACIN 1 DROP: 3 SOLUTION OPHTHALMIC at 14:40

## 2021-07-30 RX ADMIN — CIPROFLOXACIN 1 DROP: 3 SOLUTION OPHTHALMIC at 06:21

## 2021-07-30 RX ADMIN — CIPROFLOXACIN 1 DROP: 3 SOLUTION OPHTHALMIC at 15:40

## 2021-07-30 RX ADMIN — CIPROFLOXACIN 750 MG: 500 TABLET, FILM COATED ORAL at 17:20

## 2021-07-30 RX ADMIN — TOBRAMYCIN 1 DROP: 3 SOLUTION OPHTHALMIC at 21:27

## 2021-07-30 RX ADMIN — TOBRAMYCIN 1 DROP: 3 SOLUTION OPHTHALMIC at 04:20

## 2021-07-30 RX ADMIN — DOXYCYCLINE HYCLATE 100 MG: 100 CAPSULE ORAL at 21:25

## 2021-07-30 RX ADMIN — TOBRAMYCIN 1 DROP: 3 SOLUTION OPHTHALMIC at 10:00

## 2021-07-30 RX ADMIN — Medication 1000 MG: at 08:29

## 2021-07-30 RX ADMIN — TOBRAMYCIN 1 DROP: 3 SOLUTION OPHTHALMIC at 19:54

## 2021-07-30 RX ADMIN — VITAMIN D, TAB 1000IU (100/BT) 1000 UNITS: 25 TAB at 08:29

## 2021-07-30 RX ADMIN — TOBRAMYCIN 1 DROP: 3 SOLUTION OPHTHALMIC at 18:09

## 2021-07-30 RX ADMIN — CIPROFLOXACIN 1 DROP: 3 SOLUTION OPHTHALMIC at 20:41

## 2021-07-30 RX ADMIN — CIPROFLOXACIN 1 DROP: 3 SOLUTION OPHTHALMIC at 17:16

## 2021-07-30 RX ADMIN — CIPROFLOXACIN 1 DROP: 3 SOLUTION OPHTHALMIC at 16:26

## 2021-07-30 RX ADMIN — PIPERACILLIN SODIUM AND TAZOBACTAM SODIUM 4.5 G: 4; .5 INJECTION, POWDER, LYOPHILIZED, FOR SOLUTION INTRAVENOUS at 09:27

## 2021-07-30 RX ADMIN — TOBRAMYCIN 1 DROP: 3 SOLUTION OPHTHALMIC at 16:32

## 2021-07-30 RX ADMIN — ENOXAPARIN SODIUM 40 MG: 40 INJECTION SUBCUTANEOUS at 08:29

## 2021-07-30 RX ADMIN — ACETAMINOPHEN 650 MG: 325 TABLET ORAL at 02:13

## 2021-07-30 RX ADMIN — Medication 10 ML: at 21:25

## 2021-07-30 RX ADMIN — TOBRAMYCIN 1 DROP: 3 SOLUTION OPHTHALMIC at 02:05

## 2021-07-30 RX ADMIN — CIPROFLOXACIN 1 DROP: 3 SOLUTION OPHTHALMIC at 23:53

## 2021-07-30 RX ADMIN — PIPERACILLIN SODIUM AND TAZOBACTAM SODIUM 4.5 G: 4; .5 INJECTION, POWDER, LYOPHILIZED, FOR SOLUTION INTRAVENOUS at 02:02

## 2021-07-30 RX ADMIN — CIPROFLOXACIN 1 DROP: 3 SOLUTION OPHTHALMIC at 11:00

## 2021-07-30 NOTE — PROGRESS NOTES
Problem: Falls - Risk of  Goal: *Absence of Falls  Description: Document Tristan Sanon Fall Risk and appropriate interventions in the flowsheet.   Outcome: Progressing Towards Goal  Note: Fall Risk Interventions:            Medication Interventions: Evaluate medications/consider consulting pharmacy, Patient to call before getting OOB, Teach patient to arise slowly                   Problem: Patient Education: Go to Patient Education Activity  Goal: Patient/Family Education  Outcome: Progressing Towards Goal     Problem: Pain  Goal: *Control of Pain  Outcome: Progressing Towards Goal  Goal: *PALLIATIVE CARE:  Alleviation of Pain  Outcome: Progressing Towards Goal     Problem: Patient Education: Go to Patient Education Activity  Goal: Patient/Family Education  Outcome: Progressing Towards Goal

## 2021-07-30 NOTE — CONSULTS
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 male with medical hx for lung adenocarcinoma with recent chemo, CAD s/p recent CABG who was admitted by ophthalmology for R eye keratitis/scleritis. On July 14, he was \"weedeating\" grass and debris \"flew up into\" his R eye. He did go to [facility] ED and discharged with Amoxicillin. Ophthalmology eventually saw him on July 19. Intra-Vitreal cx + pseudomonas PanS. He was admitted for IV antibiotics. Ophthalmology started the patient on topical tobramycin, moxifloxacin, vancomycin along with IV Zosyn and oral doxycycline. Patient continues to improvemuch less swelling can see white. Cornea remains cloudy. Has remained afebrile and blood cultures negative. 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 No family history on file. Social History     Tobacco Use    Smoking status: Not on file   Substance Use Topics    Alcohol use: Not on file     No past surgical history on file. Prior to Admission medications    Medication Sig Start Date End Date Taking? Authorizing Provider   sterile water (preservative free) soln 10 mL with vancomycin 500 mg solr 500 mg Administer 1 Drop to right eye every  one (1) hour.    Yes Provider, Historical   moxifloxacin (VIGAMOX) 0.5 % ophthalmic solution Administer 1 Drop to right eye every  one (1) hour. Yes Provider, Historical   tobramycin (TOBREX) 0.3 % ophthalmic solution Administer 1 Drop to right eye every  one (1) hour. Yes Provider, Historical   doxycycline (MONODOX) 100 mg capsule Take 100 mg by mouth two (2) times a day. Yes Provider, Historical   ascorbic acid, vitamin C, (Vitamin C) 1,000 mg tablet Take 1,000 mg by mouth daily. Yes Provider, Historical   ciprofloxacin HCl (Cipro) 500 mg tablet Take 500 mg by mouth two (2) times a day. Yes Provider, Historical   folic acid (FOLVITE) 1 mg tablet Take 1 mg by mouth daily. Yes Provider, Historical   aspirin delayed-release 81 mg tablet Take 81 mg by mouth daily. Yes Provider, Historical   clopidogreL (Plavix) 75 mg tab Take 75 mg by mouth daily. Yes Provider, Historical   atorvastatin (LIPITOR) 80 mg tablet Take 80 mg by mouth nightly. Yes Provider, Historical   furosemide (LASIX) 40 mg tablet Take 40 mg by mouth daily. Yes Provider, Historical   spironolactone (ALDACTONE) 25 mg tablet Take 25 mg by mouth daily. Yes Provider, Historical       No Known Allergies     Review of Systems:  A comprehensive review of systems was negative except for that written in the History of Present Illness. Objective:     Visit Vitals  /75 (BP 1 Location: Right upper arm, BP Patient Position: At rest)   Pulse 84   Temp 98 °F (36.7 °C)   Resp 16   Ht 5' 6\" (1.676 m)   Wt 68.4 kg (150 lb 14.4 oz)   SpO2 97%   BMI 24.36 kg/m²     Temp (24hrs), Av.8 °F (36.6 °C), Min:97.1 °F (36.2 °C), Max:98 °F (36.7 °C)       Lines:  R chest port de accessed, PIV     Physical Exam:    General:  Alert, cooperative, well noursished, well developed, appears stated age   Eyes:   Right eye: Right sclera is injected and cornea cloudy, minimal periorbital edema.   Left eye: Sclera is white and clear   Mouth/Throat: Mucous membranes normal, oral pharynx clear   Neck: Supple   Lungs:   Clear to auscultation bilaterally, good effort   CV:  Regular rate and rhythm,no murmur, click, rub or gallop   Abdomen:   Soft, non-tender. bowel sounds normal. non-distended   Extremities: No cyanosis or edema   Skin: Skin color, texture, turgor normal. no acute rash or lesions   Lymph nodes: Cervical and supraclavicular normal   Musculoskeletal: No swelling or deformity   Lines/Devices:  Intact, no erythema, drainage or tenderness   Psych: Alert and oriented, normal mood affect given the setting       Data Review:     CBC:  Recent Labs     07/30/21 0444 07/29/21 0436 07/28/21  0507 07/28/21  0507   WBC 5.8 5.3  --  5.7   GRANS 61 54   < > 53   MONOS 10 12   < > 14*   EOS 1 2   < > 2   ANEU 3.6 2.9   < > 3.0   ABL 1.4 1.5   < > 1.6   HGB 12.5* 13.0*  --  12.3*   HCT 38.5* 40.0*  --  38.0*    209  --  226    < > = values in this interval not displayed. BMP:  Recent Labs     07/30/21 0444 07/29/21 0436 07/28/21  0507   CREA 0.77* 0.79* 0.85   BUN 19 18 17    137* 138   K 3.4* 3.4* 4.0    102 103   CO2 29 30 30   AGAP 6* 5* 5*   GLU 93 96 100       LFTS:  No results for input(s): TBILI, ALT, AP, TP, ALB in the last 72 hours.     No lab exists for component: SGOT    Microbiology:     All Micro Results     Procedure Component Value Units Date/Time    BLOOD CULTURE [[id number]] Collected: 07/24/21 1452    Order Status: Completed Specimen: Blood Updated: 07/29/21 0708     Special Requests: --        NO SPECIAL REQUESTS  RIGHT  FOREARM       Culture result: NO GROWTH 5 DAYS       BLOOD CULTURE [[id number]] Collected: 07/24/21 1530    Order Status: Completed Specimen: Blood Updated: 07/29/21 0708     Special Requests: --        LEFT  HAND       Culture result: NO GROWTH 5 DAYS             Imaging:   See \[facility] [place] [facility]\""/EPIC    Signed By: Mel Valdez NP     July 30, 2021

## 2021-07-30 NOTE — PROGRESS NOTES
TRANSFER - OUT REPORT:    Verbal report given to [facility] on Anthony Sanches  being transferred to 7th floor for routine progression of care       Report consisted of patients Situation, Background, Assessment and   Recommendations(SBAR). Information from the following report(s) SBAR, Procedure Summary, MAR and Recent Results was reviewed with the receiving nurse. Lines:   Peripheral IV 07/27/21 Anterior;Right Forearm (Active)   Site Assessment Clean, dry, & intact 07/30/21 1440   Phlebitis Assessment 0 07/30/21 1440   Infiltration Assessment 0 07/30/21 1440   Dressing Status Clean, dry, & intact 07/30/21 1440   Dressing Type Transparent;Tape 07/30/21 1440   Hub Color/Line Status Patent; Flushed 07/30/21 1440   Alcohol Cap Used No 07/30/21 1440        Opportunity for questions and clarification was provided.       Patient transported with:   Registered Nurse  Tech

## 2021-07-30 NOTE — PROGRESS NOTES
OPHTHALMOLOGY PROGRESS NOTE    Date and Time: 7/29/2021  6:45 PM    Reason For Consult: Infectious keratitis and scleritis OD    History of Present Illness: The patient is a 59 y.o. male with PMH significant for adenocarcinoma of lung on chemotherapy and myocardial infarction who was sent to the hospital after failing outpatient therapy for a corneal ulcer OD. In brief, vegetative matter hit the patient's right eye while he was weed eating last week. He was seen at the [facility] ED and discharged with recommended urgent follow up at the [address] [place] [address]. This appointment took place on 7/19, and he was immediately referred to me with a leisuo-nr-lmaudc corneal infiltrate. Culture was obtained, which showed pansensitive Pseudomonas. In spite of appropriate treatment starting 7/19, the infection spread to the sclera. He was sent to [address] [place] [address] for IV antibiotics and potential surgical intervention pending clinical course. Today he reports continuation of minimal discomfort OD.   No change in vision.    ================================================================                PHYSICAL EXAM  ================================================================  Visit Vitals  BP (!) 142/73 (BP 1 Location: Left upper arm, BP Patient Position: At rest)   Pulse 88   Temp 98 °F (36.7 °C)   Resp 18   Ht 5' 6\" (1.676 m)   Wt 68.4 kg (150 lb 14.4 oz)   SpO2 98%   BMI 24.36 kg/m²     Visual Acuity: LP OD    Pupils:   OD: No view, no RAPD by reverse   OS: 4 mm to 2 mm with light, 2+ reactive, no RAPD    Intraocular Pressure: Deferred  ________________________________________________________________________    Anterior Exam:    External Exam:   OD: Trace UL ptosis, mild mucopurulence on lashes   OS: Normal    Conjunctiva:   OD: Epithelialization over all areas of scleritis, 4+ injection, mild edema   OS: Clear    Sclera:   OD: Superior and temporal scleritis resolved, improvement of nasal and inferior perilimbal scleritis   OS: White    Cornea:   OD: Severe uwlrbc-bj-kkkwqv infiltrate, necrosis, area of crescentic thinning superiorly (Misbah negative with challenge), early epithelialization 360 degrees   OS: Clear    Anterior Chamber:   OD: No view   OS: Deep and quiet    Iris:   OD: No view   OS: Normal    Lens:   OD: PCIOL, no view   OS: 3+ brunescent NS, trace cortical    ================================================================               ASSESSMENT AND PLAN  ================================================================    1. Pansensitive Pseudomonas keratitis and scleritis OD - Significant improvement over the last several days. Infectious scleritis resolving. Now epithelialization over all iman of scleritis and onto cornea 360 degrees. Still area of corneal thinning superiorly, Misbah negative with challenge. Eye not definitively soft on light palpation of sclera. ID being consulted tomorrow morning. Considering improvement, it would be reasonable to transition to oral therapy to get ready for discharge in the next several days. Recommendations as follows:  - Fortified Tobramycin 15 mg/mL Q1H OD  - Moxifloxacin Q1H OD  - Fortified Vancomycin 50 mg/mL QID OD  - IV Zosyn per hospitalist service and ID/transition to oral therapy  - Doxycycline 100 mg PO BID  - Vitamin C 1,000 mg PO BID  - Okay for regular/heart-healthy diet    Thank you for including us in the patient's care. Please call with any further questions or concerns.     Stanton Dupont MD  [facility]

## 2021-07-30 NOTE — PROGRESS NOTES
[facility] Hospitalist Note     Admit Date:  2021  3:18 PM   Name:  Autumn Alvarado   Age:  59 y.o.  :  1960   MRN:  [id number]   PCP:  Carlo Sheikh MD  Treatment Team: Attending Provider: Adriane Elena DO; Consulting Provider: John Arellano MD; Utilization Review: Savanna Barnes RN; Care Manager: Alecia Hall, RN; Primary Nurse: Kristen Whelan RN; Consulting Provider: Lesa Guillory MD    HPI/Subjective:     Patient is a 57-year-old man with past medical history significant for CAD, adenocarcinoma of lung, on chemotherapy presented to the ED referred by ophthalmology for failed outpatient therapy for eye infection. Apparently, patient got injury to his eyes while cutting the grass. Was started on oral antibiotics and as well as eyedrops without any significant improvement and ultimately developed infective keratitis. Per report, patient has pansensitive Pseudomonas from the culture. Ophthalmologist is following and evaluating for possible surgical intervention. 2021: As per patient is feeling same. He is able to open his eyes so he feels may be slightly better. Denies any fever, chills, nausea or vomiting    Rest of the system negative except mentioned above. Assessment and Plan:     Pansensitive Pseudomonas keratitis and scleritis OD:  Cultures from [facility] showed pansensitive Pseudomonas  Start patient on vancomycin/Zosyn, pharmacy consulted to adjust dose  Dr. Diana Murphy of [facility] eye consulted  ED spoke to Dr. Diana Murphy who recommend patient be n.p.o. after midnight for possible surgical debridement tomorrow  Continue tobramycin and moxifloxacin eyedrops  Continue vitamin D 1000 mg twice daily  -ophthalmology consulted and managing. Patient noted to have cultures that are pansensitive question of patient would be appropriate for transition to oral Cipro. Will consult infectious disease in a.m. Patient noted to have failed outpatient therapy.   Antibiotic regimen is as below  -Fortified Tobramycin 15 mg/mL Q1H OD  -Ciprofloxacin Q1H OD  - Fortified Vancomycin 50 mg/mL QID OD  - IV Zosyn per hospitalist service  - Doxycycline 100 mg PO BID  - Vitamin C 1,000 mg PO BID  7/30-infectious disease consulted recommending transition to high-dose Cipro and high-dose doxycycline orally to give similar coverage to IV Zosyn. Will discontinue Zosyn and institute Cipro and doxycycline per recommendations of infectious disease      CHF/CAD:  Continue Lasix,and spironolactone  Continue aspirin. Holding Plavix given possible surgery. July 27: As per patient, he is on Plavix for his chemotherapy he does not have any stent in his body. Will resume Plavix based on ophthalmology recommendation given possible surgery. 7/29-stable with no acute process at this time. Patient's home Lasix spironolactone and aspirin have been continued. 7/30-stable with no acute process. Continue Lasix and spironolactone. Continue holding Plavix. Will defer reinstitution of Plavix to ophthalmology and surgical needs. Adenocarcinoma of lung, on chemotherapy:  Outpatient oncology follow-up  7/30-infectious disease recommending against any further chemotherapy treatments until patient's infection has cleared.       DVT ppx ordered  Code status:  Full  Estimated LOS: Based on ophthalmology recommendation            Hospital Problems as of 7/30/2021 Never Reviewed        Codes Class Noted - Resolved POA    Infective scleritis of right eye ICD-10-CM: H15.091  ICD-9-CM: 379.09  7/27/2021 - Present Unknown        * (Principal) Corneal ulcer, right ICD-10-CM: H16.001  ICD-9-CM: 370.00  7/24/2021 - Present Unknown        Adenocarcinoma of lung ([facility] [address] ICD-10-CM: C34.90  ICD-9-CM: 162.9  7/24/2021 - Present Unknown        On antineoplastic chemotherapy ICD-10-CM: Z79.899  ICD-9-CM: V58.69  7/24/2021 - Present Unknown                    Objective:     Patient Vitals for the past 24 hrs:   Temp Pulse Resp BP SpO2   07/30/21 1141 98.1 °F (36.7 °C) 86 18 126/76 96 %   07/30/21 0701 98 °F (36.7 °C) 84 16 113/75 97 %   07/30/21 0421 98 °F (36.7 °C) 87 18 122/74 97 %   07/30/21 0018 97.9 °F (36.6 °C) 84 17 124/69 97 %   07/29/21 1921 98 °F (36.7 °C) 88 18 (!) 142/73 98 %   07/29/21 1610 97.8 °F (36.6 °C) 91 16 131/75 95 %     Oxygen Therapy  O2 Sat (%): 96 % (07/30/21 1141)  Pulse via Oximetry: 82 beats per minute (07/24/21 1644)  O2 Device: None (Room air) (07/28/21 1924)    Estimated body mass index is 24.36 kg/m² as calculated from the following:    Height as of this encounter: 5' 6\" (1.676 m). Weight as of this encounter: 68.4 kg (150 lb 14.4 oz). Intake/Output Summary (Last 24 hours) at 7/30/2021 1405  Last data filed at 7/29/2021 2218  Gross per 24 hour   Intake    Output 400 ml   Net -400 ml       *Note that automatically entered I/Os may not be accurate; dependent on patient compliance with collection and accurate  by assistants. Physical Exam:  General:    Well nourished. Alert. Eyes:   Right eye with some discharge to the cornea, visible hypopyon that is green and swollen injected sclera  HENT:  Normocephalic, mucous membranes slightly dry  CV:   RRR. No m/r/g. Lungs:  CTAB. No wheezing, rhonchi, or rales. Abdomen: Soft, nontender, nondistended. Extremities: Warm and dry. No cyanosis or edema. Neurologic: AOx3, moving all 4 extremities, speech normal.  Cannot see from right eye at all  Skin:     No rashes or jaundice. Normal coloration  Psych:  Normal mood and affect. I reviewed the labs, imaging, EKGs, telemetry, and other studies done this admission.   Data Reviewed:     Procedures done this admission:  * No surgery found *    All Micro Results     Procedure Component Value Units Date/Time    BLOOD CULTURE [[id number]] Collected: 07/24/21 1452    Order Status: Completed Specimen: Blood Updated: 07/29/21 0708     Special Requests: --        NO SPECIAL REQUESTS  RIGHT  FOREARM       Culture result: NO GROWTH 5 DAYS       BLOOD CULTURE [[id number]] Collected: 07/24/21 1530    Order Status: Completed Specimen: Blood Updated: 07/29/21 0708     Special Requests: --        LEFT  HAND       Culture result: NO GROWTH 5 DAYS             SARS-CoV-2 Lab Results  \"Novel Coronavirus\" Test: No results found for: COV2NT   \"Emergent Disease\" Test: No results found for: EDPR  \"SARS-COV-2\" Test: No results found for: XGCOVT  \"Precision Labs\" Test: No results found for: RSLT  Rapid Test: No results found for: COVR         Labs: Results:       BMP, Mg, Phos Recent Labs     07/30/21 0444 07/29/21  0436 07/28/21  0507    137* 138   K 3.4* 3.4* 4.0    102 103   CO2 29 30 30   AGAP 6* 5* 5*   BUN 19 18 17   CREA 0.77* 0.79* 0.85   CA 9.1 9.3 9.3   GLU 93 96 100      CBC Recent Labs     07/30/21 0444 07/29/21  0436 07/28/21  0507   WBC 5.8 5.3 5.7   RBC 4.19* 4.37 4.13*   HGB 12.5* 13.0* 12.3*   HCT 38.5* 40.0* 38.0*    209 226   GRANS 61 54 53   LYMPH 24 28 28   EOS 1 2 2   MONOS 10 12 14*   BASOS 1 1 1   IG 2 3 4   ANEU 3.6 2.9 3.0   ABL 1.4 1.5 1.6   CHAEC 0.1 0.1 0.1   ABM 0.6 0.6 0.8   ABB 0.1 0.0 0.0   AIG 0.1 0.2 0.2      LFT No results for input(s): ALT, TBIL, AP, TP, ALB, GLOB, AGRAT in the last 72 hours.     No lab exists for component: SGOT, GPT   Cardiac Testing No results found for: BNPP, BNP, CPK, RCK1, RCK2, RCK3, RCK4, CKMB, CKNDX, CKND1, TROPT, TROIQ   Coagulation Tests No results found for: PTP, INR, APTT, INREXT, INREXT   A1c Lab Results   Component Value Date/Time    Hemoglobin A1c 5.7 07/25/2021 04:02 AM      Lipid Panel No results found for: CHOL, CHOLPOCT, CHOLX, CHLST, CHOLV, 922806, HDL, HDLP, LDL, LDLC, DLDLP, 134156, VLDLC, VLDL, TGLX, TRIGL, TRIGP, TGLPOCT, CHHD, CHHDX   Thyroid Panel No results found for: TSH, T4, FT4, TT3, T3U, TSHEXT, TSHEXT     Most Recent UA No results found for: COLOR, APPRN, REFSG, TREV, PROTU, GLUCU, KETU, BILU, BLDU, UROU, MEGHA, LEUKU, WBCU, RBCU, UEPI, BACTU, CASTS, UCRY, MUCUS, UCOM         Current Facility-Administered Medications   Medication Dose Route Frequency    ciprofloxacin HCl (CIPRO) tablet 750 mg  750 mg Oral Q12H    doxycycline (VIBRAMYCIN) capsule 100 mg  100 mg Oral Q12H    fortified vancomycin 50 mg/ml eye drops   1 Drop Right Eye QID    ciprofloxacin HCl (CILOXAN) 0.3 % ophthalmic solution 1 Drop  1 Drop Right Eye Q1H    ascorbic acid (vitamin C) (VITAMIN C) tablet 1,000 mg  1,000 mg Oral BID    oxyCODONE IR (ROXICODONE) tablet 5 mg  5 mg Oral Q4H PRN    aspirin delayed-release tablet 81 mg  81 mg Oral DAILY    sodium chloride (NS) flush 5-10 mL  5-10 mL IntraVENous Q8H    sodium chloride (NS) flush 5-10 mL  5-10 mL IntraVENous PRN    sodium chloride (NS) flush 5-40 mL  5-40 mL IntraVENous PRN    acetaminophen (TYLENOL) tablet 650 mg  650 mg Oral Q6H PRN    Or    acetaminophen (TYLENOL) suppository 650 mg  650 mg Rectal Q6H PRN    polyethylene glycol (MIRALAX) packet 17 g  17 g Oral DAILY PRN    ondansetron (ZOFRAN ODT) tablet 4 mg  4 mg Oral Q8H PRN    Or    ondansetron (ZOFRAN) injection 4 mg  4 mg IntraVENous Q6H PRN    enoxaparin (LOVENOX) injection 40 mg  40 mg SubCUTAneous DAILY    spironolactone (ALDACTONE) tablet 25 mg  25 mg Oral DAILY    furosemide (LASIX) tablet 40 mg  40 mg Oral DAILY    cholecalciferol (VITAMIN D3) (1000 Units /25 mcg) tablet 1,000 Units  1,000 Units Oral BID    fortified tobramycin (NEBCIN) 15 mg/ml eye drops   1 Drop Ophthalmic Q1H       Other Studies:  No results found for this visit on 07/24/21. No results found.     SARS-CoV-2 Lab Results  \"Novel Coronavirus\" Test: No results found for: COV2NT   \"Emergent Disease\" Test: No results found for: EDPR  \"SARS-COV-2\" Test: No results found for: XGCOVT  Rapid Test: No results found for: COVR            Part of this note was written by using a voice dictation software and the note has been proof read but may still contain some grammatical/other typographical errors.     Signed:  Andrei Carlin, DO

## 2021-07-30 NOTE — PROGRESS NOTES
TRANSFER - IN REPORT:    Verbal report received from Nelda RN(name) on [facility]  being received from 9th floor(unit) for routine progression of care      Report consisted of patients Situation, Background, Assessment and   Recommendations(SBAR). Information from the following report(s) SBAR, Kardex and MAR was reviewed with the receiving nurse. Opportunity for questions and clarification was provided. Assessment completed upon patients arrival to unit and care assumed.

## 2021-07-31 LAB
ANION GAP SERPL CALC-SCNC: 7 MMOL/L (ref 7–16)
BASOPHILS # BLD: 0.1 K/UL (ref 0–0.2)
BASOPHILS NFR BLD: 1 % (ref 0–2)
BUN SERPL-MCNC: 16 MG/DL (ref 8–23)
CALCIUM SERPL-MCNC: 9.4 MG/DL (ref 8.3–10.4)
CHLORIDE SERPL-SCNC: 103 MMOL/L (ref 98–107)
CO2 SERPL-SCNC: 28 MMOL/L (ref 21–32)
CREAT SERPL-MCNC: 0.74 MG/DL (ref 0.8–1.5)
DIFFERENTIAL METHOD BLD: ABNORMAL
EOSINOPHIL # BLD: 0.1 K/UL (ref 0–0.8)
EOSINOPHIL NFR BLD: 1 % (ref 0.5–7.8)
ERYTHROCYTE [DISTWIDTH] IN BLOOD BY AUTOMATED COUNT: 14.9 % (ref 11.9–14.6)
GLUCOSE SERPL-MCNC: 93 MG/DL (ref 65–100)
HCT VFR BLD AUTO: 41.2 % (ref 41.1–50.3)
HGB BLD-MCNC: 13.6 G/DL (ref 13.6–17.2)
IMM GRANULOCYTES # BLD AUTO: 0.2 K/UL (ref 0–0.5)
IMM GRANULOCYTES NFR BLD AUTO: 2 % (ref 0–5)
LYMPHOCYTES # BLD: 1.6 K/UL (ref 0.5–4.6)
LYMPHOCYTES NFR BLD: 25 % (ref 13–44)
MCH RBC QN AUTO: 30.3 PG (ref 26.1–32.9)
MCHC RBC AUTO-ENTMCNC: 33 G/DL (ref 31.4–35)
MCV RBC AUTO: 91.8 FL (ref 79.6–97.8)
MONOCYTES # BLD: 0.7 K/UL (ref 0.1–1.3)
MONOCYTES NFR BLD: 10 % (ref 4–12)
NEUTS SEG # BLD: 3.9 K/UL (ref 1.7–8.2)
NEUTS SEG NFR BLD: 61 % (ref 43–78)
NRBC # BLD: 0 K/UL (ref 0–0.2)
PLATELET # BLD AUTO: 202 K/UL (ref 150–450)
PMV BLD AUTO: 11.1 FL (ref 9.4–12.3)
POTASSIUM SERPL-SCNC: 3.5 MMOL/L (ref 3.5–5.1)
RBC # BLD AUTO: 4.49 M/UL (ref 4.23–5.6)
SODIUM SERPL-SCNC: 138 MMOL/L (ref 136–145)
WBC # BLD AUTO: 6.4 K/UL (ref 4.3–11.1)

## 2021-07-31 PROCEDURE — 74011000250 HC RX REV CODE- 250: Performed by: FAMILY MEDICINE

## 2021-07-31 PROCEDURE — 74011250637 HC RX REV CODE- 250/637: Performed by: NURSE PRACTITIONER

## 2021-07-31 PROCEDURE — 65270000029 HC RM PRIVATE

## 2021-07-31 PROCEDURE — 85025 COMPLETE CBC W/AUTO DIFF WBC: CPT

## 2021-07-31 PROCEDURE — 74011250637 HC RX REV CODE- 250/637: Performed by: INTERNAL MEDICINE

## 2021-07-31 PROCEDURE — 80048 BASIC METABOLIC PNL TOTAL CA: CPT

## 2021-07-31 PROCEDURE — 74011000250 HC RX REV CODE- 250: Performed by: OPHTHALMOLOGY

## 2021-07-31 PROCEDURE — 74011250636 HC RX REV CODE- 250/636: Performed by: OPHTHALMOLOGY

## 2021-07-31 PROCEDURE — 36415 COLL VENOUS BLD VENIPUNCTURE: CPT

## 2021-07-31 PROCEDURE — 74011250637 HC RX REV CODE- 250/637: Performed by: FAMILY MEDICINE

## 2021-07-31 PROCEDURE — 74011250636 HC RX REV CODE- 250/636: Performed by: FAMILY MEDICINE

## 2021-07-31 RX ORDER — CIPROFLOXACIN HYDROCHLORIDE 3.5 MG/ML
1 SOLUTION/ DROPS TOPICAL
Status: DISCONTINUED | OUTPATIENT
Start: 2021-07-31 | End: 2021-08-01 | Stop reason: HOSPADM

## 2021-07-31 RX ADMIN — TOBRAMYCIN 1 DROP: 3 SOLUTION OPHTHALMIC at 12:09

## 2021-07-31 RX ADMIN — CIPROFLOXACIN 1 DROP: 3 SOLUTION OPHTHALMIC at 05:17

## 2021-07-31 RX ADMIN — CIPROFLOXACIN 1 DROP: 3 SOLUTION OPHTHALMIC at 17:02

## 2021-07-31 RX ADMIN — CIPROFLOXACIN 1 DROP: 3 SOLUTION OPHTHALMIC at 04:13

## 2021-07-31 RX ADMIN — VITAMIN D, TAB 1000IU (100/BT) 1000 UNITS: 25 TAB at 17:39

## 2021-07-31 RX ADMIN — TOBRAMYCIN 1 DROP: 3 SOLUTION OPHTHALMIC at 22:10

## 2021-07-31 RX ADMIN — CIPROFLOXACIN 1 DROP: 3 SOLUTION OPHTHALMIC at 12:09

## 2021-07-31 RX ADMIN — TOBRAMYCIN 1 DROP: 3 SOLUTION OPHTHALMIC at 19:18

## 2021-07-31 RX ADMIN — Medication 10 ML: at 06:07

## 2021-07-31 RX ADMIN — TOBRAMYCIN 1 DROP: 3 SOLUTION OPHTHALMIC at 14:16

## 2021-07-31 RX ADMIN — TOBRAMYCIN 1 DROP: 3 SOLUTION OPHTHALMIC at 18:21

## 2021-07-31 RX ADMIN — CIPROFLOXACIN 1 DROP: 3 SOLUTION OPHTHALMIC at 06:06

## 2021-07-31 RX ADMIN — CIPROFLOXACIN 1 DROP: 3 SOLUTION OPHTHALMIC at 19:17

## 2021-07-31 RX ADMIN — TOBRAMYCIN 1 DROP: 3 SOLUTION OPHTHALMIC at 03:10

## 2021-07-31 RX ADMIN — TOBRAMYCIN 1 DROP: 3 SOLUTION OPHTHALMIC at 05:17

## 2021-07-31 RX ADMIN — CIPROFLOXACIN 1 DROP: 3 SOLUTION OPHTHALMIC at 18:21

## 2021-07-31 RX ADMIN — CIPROFLOXACIN HYDROCHLORIDE 1 DROP: 3.5 SOLUTION/ DROPS TOPICAL at 22:10

## 2021-07-31 RX ADMIN — TOBRAMYCIN 1 DROP: 3 SOLUTION OPHTHALMIC at 09:09

## 2021-07-31 RX ADMIN — Medication 10 ML: at 22:11

## 2021-07-31 RX ADMIN — TOBRAMYCIN 1 DROP: 3 SOLUTION OPHTHALMIC at 07:34

## 2021-07-31 RX ADMIN — TOBRAMYCIN 1 DROP: 3 SOLUTION OPHTHALMIC at 17:02

## 2021-07-31 RX ADMIN — CIPROFLOXACIN 1 DROP: 3 SOLUTION OPHTHALMIC at 15:05

## 2021-07-31 RX ADMIN — TOBRAMYCIN 1 DROP: 3 SOLUTION OPHTHALMIC at 04:13

## 2021-07-31 RX ADMIN — ASPIRIN 81 MG: 81 TABLET ORAL at 09:08

## 2021-07-31 RX ADMIN — TOBRAMYCIN 1 DROP: 3 SOLUTION OPHTHALMIC at 01:16

## 2021-07-31 RX ADMIN — DOXYCYCLINE HYCLATE 100 MG: 100 CAPSULE ORAL at 09:09

## 2021-07-31 RX ADMIN — DOXYCYCLINE HYCLATE 100 MG: 100 CAPSULE ORAL at 22:11

## 2021-07-31 RX ADMIN — CIPROFLOXACIN 1 DROP: 3 SOLUTION OPHTHALMIC at 01:16

## 2021-07-31 RX ADMIN — CIPROFLOXACIN 1 DROP: 3 SOLUTION OPHTHALMIC at 02:19

## 2021-07-31 RX ADMIN — TOBRAMYCIN 1 DROP: 3 SOLUTION OPHTHALMIC at 16:00

## 2021-07-31 RX ADMIN — CIPROFLOXACIN 1 DROP: 3 SOLUTION OPHTHALMIC at 07:34

## 2021-07-31 RX ADMIN — CIPROFLOXACIN 1 DROP: 3 SOLUTION OPHTHALMIC at 00:32

## 2021-07-31 RX ADMIN — TOBRAMYCIN 1 DROP: 3 SOLUTION OPHTHALMIC at 15:05

## 2021-07-31 RX ADMIN — Medication 10 ML: at 14:14

## 2021-07-31 RX ADMIN — CIPROFLOXACIN 1 DROP: 3 SOLUTION OPHTHALMIC at 13:03

## 2021-07-31 RX ADMIN — ENOXAPARIN SODIUM 40 MG: 40 INJECTION SUBCUTANEOUS at 09:09

## 2021-07-31 RX ADMIN — TOBRAMYCIN 1 DROP: 3 SOLUTION OPHTHALMIC at 06:06

## 2021-07-31 RX ADMIN — Medication 1000 MG: at 09:09

## 2021-07-31 RX ADMIN — CIPROFLOXACIN 1 DROP: 3 SOLUTION OPHTHALMIC at 08:15

## 2021-07-31 RX ADMIN — CIPROFLOXACIN 1 DROP: 3 SOLUTION OPHTHALMIC at 14:16

## 2021-07-31 RX ADMIN — CIPROFLOXACIN 1 DROP: 3 SOLUTION OPHTHALMIC at 11:11

## 2021-07-31 RX ADMIN — CIPROFLOXACIN 1 DROP: 3 SOLUTION OPHTHALMIC at 10:11

## 2021-07-31 RX ADMIN — CIPROFLOXACIN 750 MG: 500 TABLET, FILM COATED ORAL at 17:41

## 2021-07-31 RX ADMIN — TOBRAMYCIN 1 DROP: 3 SOLUTION OPHTHALMIC at 00:32

## 2021-07-31 RX ADMIN — TOBRAMYCIN 1 DROP: 3 SOLUTION OPHTHALMIC at 10:12

## 2021-07-31 RX ADMIN — CIPROFLOXACIN 1 DROP: 3 SOLUTION OPHTHALMIC at 09:10

## 2021-07-31 RX ADMIN — TOBRAMYCIN 1 DROP: 3 SOLUTION OPHTHALMIC at 02:19

## 2021-07-31 RX ADMIN — CIPROFLOXACIN 750 MG: 500 TABLET, FILM COATED ORAL at 06:04

## 2021-07-31 RX ADMIN — VITAMIN D, TAB 1000IU (100/BT) 1000 UNITS: 25 TAB at 09:09

## 2021-07-31 RX ADMIN — SPIRONOLACTONE 25 MG: 25 TABLET ORAL at 09:08

## 2021-07-31 RX ADMIN — Medication 1000 MG: at 17:39

## 2021-07-31 RX ADMIN — TOBRAMYCIN 1 DROP: 3 SOLUTION OPHTHALMIC at 08:15

## 2021-07-31 RX ADMIN — TOBRAMYCIN 1 DROP: 3 SOLUTION OPHTHALMIC at 11:10

## 2021-07-31 RX ADMIN — CIPROFLOXACIN 1 DROP: 3 SOLUTION OPHTHALMIC at 03:10

## 2021-07-31 RX ADMIN — FUROSEMIDE 40 MG: 40 TABLET ORAL at 09:08

## 2021-07-31 RX ADMIN — TOBRAMYCIN 1 DROP: 3 SOLUTION OPHTHALMIC at 13:04

## 2021-07-31 RX ADMIN — CIPROFLOXACIN 1 DROP: 3 SOLUTION OPHTHALMIC at 16:00

## 2021-07-31 NOTE — PROGRESS NOTES
Problem: Falls - Risk of  Goal: *Absence of Falls  Description: Document Jami Mistry Fall Risk and appropriate interventions in the flowsheet.   Outcome: Progressing Towards Goal  Note: Fall Risk Interventions:            Medication Interventions: Teach patient to arise slowly                   Problem: Patient Education: Go to Patient Education Activity  Goal: Patient/Family Education  Outcome: Progressing Towards Goal     Problem: Pain  Goal: *Control of Pain  Outcome: Progressing Towards Goal  Goal: *PALLIATIVE CARE:  Alleviation of Pain  Outcome: Progressing Towards Goal     Problem: Patient Education: Go to Patient Education Activity  Goal: Patient/Family Education  Outcome: Progressing Towards Goal

## 2021-07-31 NOTE — PROGRESS NOTES
[facility] Hospitalist Note     Admit Date:  2021  3:18 PM   Name:  Dann Akers   Age:  59 y.o.  :  1960   MRN:  [id number]   PCP:  Merlin Gavia, MD  Treatment Team: Attending Provider: Chelo Jean DO; Consulting Provider: Bhakti Flores MD; Utilization Review: Karen Perez RN; Care Manager: Emerald Ivory RN; Consulting Provider: Apryl Gibbons NP; Consulting Provider: Charlie Art MD; Staff Nurse: Charis Mendoza RN    HPI/Subjective:     Patient is a 75-year-old man with past medical history significant for CAD, adenocarcinoma of lung, on chemotherapy presented to the ED referred by ophthalmology for failed outpatient therapy for eye infection. Apparently, patient got injury to his eyes while cutting the grass. Was started on oral antibiotics and as well as eyedrops without any significant improvement and ultimately developed infective keratitis. Per report, patient has pansensitive Pseudomonas from the culture. Ophthalmologist is following and evaluating for possible surgical intervention. 2021: As per patient is feeling same. He is able to open his eyes so he feels may be slightly better. Denies any fever, chills, nausea or vomiting    Rest of the system negative except mentioned above. Assessment and Plan:     Pansensitive Pseudomonas keratitis and scleritis OD:  Cultures from [facility] showed pansensitive Pseudomonas  Start patient on vancomycin/Zosyn, pharmacy consulted to adjust dose  Dr. See Box of [facility] eye consulted  ED spoke to Dr. See Box who recommend patient be n.p.o. after midnight for possible surgical debridement tomorrow  Continue tobramycin and moxifloxacin eyedrops  Continue vitamin D 1000 mg twice daily  -ophthalmology consulted and managing. Patient noted to have cultures that are pansensitive question of patient would be appropriate for transition to oral Cipro. Will consult infectious disease in a.m.   Patient noted to have failed outpatient therapy. Antibiotic regimen is as below  -Fortified Tobramycin 15 mg/mL Q1H OD  -Ciprofloxacin Q1H OD  - Fortified Vancomycin 50 mg/mL QID OD  - IV Zosyn per hospitalist service  - Doxycycline 100 mg PO BID  - Vitamin C 1,000 mg PO BID  7/30-infectious disease consulted recommending transition to high-dose Cipro and high-dose doxycycline orally to give similar coverage to IV Zosyn. Will discontinue Zosyn and institute Cipro and doxycycline per recommendations of infectious disease  7/31-continue oral Cipro and doxycycline as well as current OD antibiotic regimen per ophthalmology. Await further recommendations from ophthalmology regarding discharge    CHF/CAD:  Continue Lasix,and spironolactone  Continue aspirin. Holding Plavix given possible surgery. July 27: As per patient, he is on Plavix for his chemotherapy he does not have any stent in his body. Will resume Plavix based on ophthalmology recommendation given possible surgery. 7/29-stable with no acute process at this time. Patient's home Lasix spironolactone and aspirin have been continued. 7/30-stable with no acute process. Continue Lasix and spironolactone. Continue holding Plavix. Will defer reinstitution of Plavix to ophthalmology and surgical needs. 7/31-stable    Adenocarcinoma of lung, on chemotherapy:  Outpatient oncology follow-up  7/30-infectious disease recommending against any further chemotherapy treatments until patient's infection has cleared.       DVT ppx ordered  Code status:  Full  Estimated LOS: Based on ophthalmology recommendation            Hospital Problems as of 7/31/2021 Never Reviewed        Codes Class Noted - Resolved POA    Infective scleritis of right eye ICD-10-CM: H15.091  ICD-9-CM: 379.09  7/27/2021 - Present Unknown        * (Principal) Corneal ulcer, right ICD-10-CM: H16.001  ICD-9-CM: 370.00  7/24/2021 - Present Unknown        Adenocarcinoma of lung ([facility] [address] ICD-10-CM: C34.90  ICD-9-CM: 162.9 7/24/2021 - Present Unknown        On antineoplastic chemotherapy ICD-10-CM: Z79.899  ICD-9-CM: V58.69  7/24/2021 - Present Unknown                    Objective:     Patient Vitals for the past 24 hrs:   Temp Pulse Resp BP SpO2   07/31/21 1200 98.1 °F (36.7 °C) 99 18 (!) 143/80 96 %   07/31/21 0749 98.1 °F (36.7 °C) 91 18 (!) 142/88 98 %   07/31/21 0352 98 °F (36.7 °C) 83 17 (!) 142/82 98 %   07/30/21 2238 98.1 °F (36.7 °C) 86 16 122/79 99 %   07/30/21 1935 98.2 °F (36.8 °C) 85 16 (!) 149/83 98 %   07/30/21 1639 98 °F (36.7 °C) 93 16 139/85 95 %   07/30/21 1614 98.2 °F (36.8 °C) 79 18 116/70 95 %     Oxygen Therapy  O2 Sat (%): 96 % (07/31/21 1200)  Pulse via Oximetry: 82 beats per minute (07/24/21 1644)  O2 Device: None (Room air) (07/31/21 4114)    Estimated body mass index is 24.36 kg/m² as calculated from the following:    Height as of this encounter: 5' 6\" (1.676 m). Weight as of this encounter: 68.4 kg (150 lb 14.4 oz). No intake or output data in the 24 hours ending 07/31/21 1357    *Note that automatically entered I/Os may not be accurate; dependent on patient compliance with collection and accurate  by assistants. Physical Exam:  General:    Well nourished. Alert. Eyes:   Right eye with some discharge to the cornea, visible hypopyon that is green and swollen injected sclera  HENT:  Normocephalic, mucous membranes slightly dry  CV:   RRR. No m/r/g. Lungs:  CTAB. No wheezing, rhonchi, or rales. Abdomen: Soft, nontender, nondistended. Extremities: Warm and dry. No cyanosis or edema. Neurologic: AOx3, moving all 4 extremities, speech normal.  Cannot see from right eye at all  Skin:     No rashes or jaundice. Normal coloration  Psych:  Normal mood and affect. I reviewed the labs, imaging, EKGs, telemetry, and other studies done this admission.   Data Reviewed:     Procedures done this admission:  * No surgery found *    All Micro Results     Procedure Component Value Units Date/Time    BLOOD CULTURE [[id number]] Collected: 07/24/21 1452    Order Status: Completed Specimen: Blood Updated: 07/29/21 0708     Special Requests: --        NO SPECIAL REQUESTS  RIGHT  FOREARM       Culture result: NO GROWTH 5 DAYS       BLOOD CULTURE [[id number]] Collected: 07/24/21 1530    Order Status: Completed Specimen: Blood Updated: 07/29/21 0708     Special Requests: --        LEFT  HAND       Culture result: NO GROWTH 5 DAYS             SARS-CoV-2 Lab Results  \"Novel Coronavirus\" Test: No results found for: COV2NT   \"Emergent Disease\" Test: No results found for: EDPR  \"SARS-COV-2\" Test: No results found for: XGCOVT  \"Precision Labs\" Test: No results found for: RSLT  Rapid Test: No results found for: COVR         Labs: Results:       BMP, Mg, Phos Recent Labs     07/31/21  0616 07/30/21 0444 07/29/21  0436    138 137*   K 3.5 3.4* 3.4*    103 102   CO2 28 29 30   AGAP 7 6* 5*   BUN 16 19 18   CREA 0.74* 0.77* 0.79*   CA 9.4 9.1 9.3   GLU 93 93 96      CBC Recent Labs     07/31/21  0616 07/30/21 0444 07/29/21  0436   WBC 6.4 5.8 5.3   RBC 4.49 4.19* 4.37   HGB 13.6 12.5* 13.0*   HCT 41.2 38.5* 40.0*    195 209   GRANS 61 61 54   LYMPH 25 24 28   EOS 1 1 2   MONOS 10 10 12   BASOS 1 1 1   IG 2 2 3   ANEU 3.9 3.6 2.9   ABL 1.6 1.4 1.5   CHACE 0.1 0.1 0.1   ABM 0.7 0.6 0.6   ABB 0.1 0.1 0.0   AIG 0.2 0.1 0.2      LFT No results for input(s): ALT, TBIL, AP, TP, ALB, GLOB, AGRAT in the last 72 hours.     No lab exists for component: SGOT, GPT   Cardiac Testing No results found for: BNPP, BNP, CPK, RCK1, RCK2, RCK3, RCK4, CKMB, CKNDX, CKND1, TROPT, TROIQ   Coagulation Tests No results found for: PTP, INR, APTT, INREXT, INREXT   A1c Lab Results   Component Value Date/Time    Hemoglobin A1c 5.7 07/25/2021 04:02 AM      Lipid Panel No results found for: CHOL, CHOLPOCT, CHOLX, CHLST, CHOLV, 595305, HDL, HDLP, LDL, LDLC, DLDLP, 815692, VLDLC, VLDL, TGLX, TRIGL, TRIGP, TGLPOCT, CHHD, CHHDX   Thyroid Panel No results found for: TSH, T4, FT4, TT3, T3U, TSHEXT, TSHEXT     Most Recent UA No results found for: COLOR, APPRN, REFSG, TREV, PROTU, GLUCU, KETU, BILU, BLDU, UROU, MEGHA, LEUKU, WBCU, RBCU, UEPI, BACTU, CASTS, UCRY, MUCUS, UCOM         Current Facility-Administered Medications   Medication Dose Route Frequency    ciprofloxacin HCl (CIPRO) tablet 750 mg  750 mg Oral Q12H    doxycycline (VIBRAMYCIN) capsule 100 mg  100 mg Oral Q12H    fortified vancomycin 50 mg/ml eye drops   1 Drop Right Eye QID    ciprofloxacin HCl (CILOXAN) 0.3 % ophthalmic solution 1 Drop  1 Drop Right Eye Q1H    ascorbic acid (vitamin C) (VITAMIN C) tablet 1,000 mg  1,000 mg Oral BID    oxyCODONE IR (ROXICODONE) tablet 5 mg  5 mg Oral Q4H PRN    aspirin delayed-release tablet 81 mg  81 mg Oral DAILY    sodium chloride (NS) flush 5-10 mL  5-10 mL IntraVENous Q8H    sodium chloride (NS) flush 5-10 mL  5-10 mL IntraVENous PRN    sodium chloride (NS) flush 5-40 mL  5-40 mL IntraVENous PRN    acetaminophen (TYLENOL) tablet 650 mg  650 mg Oral Q6H PRN    Or    acetaminophen (TYLENOL) suppository 650 mg  650 mg Rectal Q6H PRN    polyethylene glycol (MIRALAX) packet 17 g  17 g Oral DAILY PRN    ondansetron (ZOFRAN ODT) tablet 4 mg  4 mg Oral Q8H PRN    Or    ondansetron (ZOFRAN) injection 4 mg  4 mg IntraVENous Q6H PRN    enoxaparin (LOVENOX) injection 40 mg  40 mg SubCUTAneous DAILY    spironolactone (ALDACTONE) tablet 25 mg  25 mg Oral DAILY    furosemide (LASIX) tablet 40 mg  40 mg Oral DAILY    cholecalciferol (VITAMIN D3) (1000 Units /25 mcg) tablet 1,000 Units  1,000 Units Oral BID    fortified tobramycin (NEBCIN) 15 mg/ml eye drops   1 Drop Ophthalmic Q1H       Other Studies:  No results found for this visit on 07/24/21. No results found.     SARS-CoV-2 Lab Results  \"Novel Coronavirus\" Test: No results found for: COV2NT   \"Emergent Disease\" Test: No results found for: EDPR  \"SARS-COV-2\" Test: No results found for: XGCOVT  Rapid Test: No results found for: COVR            Part of this note was written by using a voice dictation software and the note has been proof read but may still contain some grammatical/other typographical errors.     Signed:  Monita Sandifer, DO

## 2021-07-31 NOTE — PROGRESS NOTES
OPHTHALMOLOGY PROGRESS NOTE    Date and Time: 7/31/2021  6:45 PM    Reason For Consult: Infectious keratitis and scleritis OD    History of Present Illness: The patient is a 59 y.o. male with PMH significant for adenocarcinoma of lung on chemotherapy and myocardial infarction who was sent to the hospital after failing outpatient therapy for a corneal ulcer OD. In brief, vegetative matter hit the patient's right eye while he was weed eating last week. He was seen at the [facility] ED and discharged with recommended urgent follow up at the [address] [place] [address]. This appointment took place on 7/19, and he was immediately referred to me with a crqzro-as-iidifh corneal infiltrate. Culture was obtained, which showed pansensitive Pseudomonas. In spite of appropriate treatment starting 7/19, the infection spread to the sclera. He was sent to [facility] for IV antibiotics and potential surgical intervention pending clinical course. Today he reports his pain has resolved.   No change in vision.    ================================================================                PHYSICAL EXAM  ================================================================  Visit Vitals  BP (!) 146/64   Pulse 97   Temp 97.9 °F (36.6 °C)   Resp 18   Ht 5' 6\" (1.676 m)   Wt 68.4 kg (150 lb 14.4 oz)   SpO2 95%   BMI 24.36 kg/m²     Visual Acuity: LP OD    Pupils:   OD: No view, 3+ RAPD by reverse   OS: 4 mm to 2 mm with light, 2+ reactive, no RAPD    Intraocular Pressure: Deferred  ________________________________________________________________________    Anterior Exam:    External Exam:   OD: Trace UL ptosis   OS: Normal    Conjunctiva:   OD: Epithelialization over all areas of scleritis, 4+ injection   OS: Clear    Sclera:   OD: Scleritis appears resolved 360 degrees, few areas of ischemia (at least superficial) without thinning nasally   OS: White    Cornea:   OD: Severe zohgcq-wy-seftel infiltrate, necrosis, area of crescentic thinning superiorly (possible slight Misbah positivity with challenge), early epithelialization 360 degrees   OS: Clear    Anterior Chamber:   OD: No view   OS: Deep and quiet    Iris:   OD: No view   OS: Normal    Lens:   OD: PCIOL, no view   OS: 3+ brunescent NS, trace cortical    ================================================================               ASSESSMENT AND PLAN  ================================================================    1. Pansensitive Pseudomonas keratitis and scleritis OD - Infectious scleritis appears resolved. Now epithelialization over all iman of scleritis and onto cornea 360 degrees. Still area of corneal thinning superiorly, possible slight Misbah positivity with challenge. As such, cyanoacrylate placed over this area. RAPD seen today, will have to further investigate outpatient. Patient not in pain, so I think endophthalmitis unlikely. Considering improvement after transitioning to oral antibiotics, I think he is okay for discharge. [facility] will call patient on Monday morning to be seen in the afternoon. Recommendations as follows:  - Fortified Tobramycin 15 mg/mL Q2H OD during the day, Q3H at night  - Moxifloxacin Q2H OD during the day, Q3H at night  - Fortified Vancomycin 50 mg/mL BID OD  - Ciprofloxacin 750 mg PO BID  - Doxycycline 100 mg PO BID  - Vitamin C 1,000 mg PO BID  - Okay for regular/heart-healthy diet  - Okay to resume Plavix    Thank you for including us in the patient's care. Please call with any further questions or concerns.     Prakash Ramirez MD  [facility]

## 2021-08-01 VITALS
WEIGHT: 150.9 LBS | RESPIRATION RATE: 16 BRPM | SYSTOLIC BLOOD PRESSURE: 132 MMHG | OXYGEN SATURATION: 99 % | DIASTOLIC BLOOD PRESSURE: 79 MMHG | HEART RATE: 91 BPM | TEMPERATURE: 98.4 F | BODY MASS INDEX: 24.25 KG/M2 | HEIGHT: 66 IN

## 2021-08-01 LAB
ANION GAP SERPL CALC-SCNC: 8 MMOL/L (ref 7–16)
BASOPHILS # BLD: 0 K/UL (ref 0–0.2)
BASOPHILS NFR BLD: 1 % (ref 0–2)
BUN SERPL-MCNC: 17 MG/DL (ref 8–23)
CALCIUM SERPL-MCNC: 9.3 MG/DL (ref 8.3–10.4)
CHLORIDE SERPL-SCNC: 104 MMOL/L (ref 98–107)
CO2 SERPL-SCNC: 27 MMOL/L (ref 21–32)
CREAT SERPL-MCNC: 0.75 MG/DL (ref 0.8–1.5)
DIFFERENTIAL METHOD BLD: ABNORMAL
EOSINOPHIL # BLD: 0.1 K/UL (ref 0–0.8)
EOSINOPHIL NFR BLD: 1 % (ref 0.5–7.8)
ERYTHROCYTE [DISTWIDTH] IN BLOOD BY AUTOMATED COUNT: 14.8 % (ref 11.9–14.6)
GLUCOSE SERPL-MCNC: 84 MG/DL (ref 65–100)
HCT VFR BLD AUTO: 39.4 % (ref 41.1–50.3)
HGB BLD-MCNC: 13 G/DL (ref 13.6–17.2)
IMM GRANULOCYTES # BLD AUTO: 0.1 K/UL (ref 0–0.5)
IMM GRANULOCYTES NFR BLD AUTO: 2 % (ref 0–5)
LYMPHOCYTES # BLD: 1.3 K/UL (ref 0.5–4.6)
LYMPHOCYTES NFR BLD: 22 % (ref 13–44)
MCH RBC QN AUTO: 30 PG (ref 26.1–32.9)
MCHC RBC AUTO-ENTMCNC: 33 G/DL (ref 31.4–35)
MCV RBC AUTO: 90.8 FL (ref 79.6–97.8)
MONOCYTES # BLD: 0.7 K/UL (ref 0.1–1.3)
MONOCYTES NFR BLD: 12 % (ref 4–12)
NEUTS SEG # BLD: 3.6 K/UL (ref 1.7–8.2)
NEUTS SEG NFR BLD: 63 % (ref 43–78)
NRBC # BLD: 0 K/UL (ref 0–0.2)
PLATELET # BLD AUTO: 185 K/UL (ref 150–450)
PMV BLD AUTO: 11 FL (ref 9.4–12.3)
POTASSIUM SERPL-SCNC: 3.4 MMOL/L (ref 3.5–5.1)
RBC # BLD AUTO: 4.34 M/UL (ref 4.23–5.6)
SODIUM SERPL-SCNC: 139 MMOL/L (ref 136–145)
WBC # BLD AUTO: 5.8 K/UL (ref 4.3–11.1)

## 2021-08-01 PROCEDURE — 74011250637 HC RX REV CODE- 250/637: Performed by: INTERNAL MEDICINE

## 2021-08-01 PROCEDURE — 74011250636 HC RX REV CODE- 250/636: Performed by: FAMILY MEDICINE

## 2021-08-01 PROCEDURE — 80048 BASIC METABOLIC PNL TOTAL CA: CPT

## 2021-08-01 PROCEDURE — 85025 COMPLETE CBC W/AUTO DIFF WBC: CPT

## 2021-08-01 PROCEDURE — 74011250637 HC RX REV CODE- 250/637: Performed by: FAMILY MEDICINE

## 2021-08-01 PROCEDURE — 36415 COLL VENOUS BLD VENIPUNCTURE: CPT

## 2021-08-01 PROCEDURE — 74011250637 HC RX REV CODE- 250/637: Performed by: NURSE PRACTITIONER

## 2021-08-01 RX ORDER — DOXYCYCLINE 100 MG/1
100 CAPSULE ORAL EVERY 12 HOURS
Qty: 28 CAPSULE | Refills: 0 | Status: SHIPPED | OUTPATIENT
Start: 2021-08-01 | End: 2021-08-15

## 2021-08-01 RX ORDER — CIPROFLOXACIN 750 MG/1
750 TABLET, FILM COATED ORAL EVERY 12 HOURS
Qty: 28 TABLET | Refills: 0 | Status: SHIPPED | OUTPATIENT
Start: 2021-08-01 | End: 2021-08-15

## 2021-08-01 RX ADMIN — TOBRAMYCIN 1 DROP: 3 SOLUTION OPHTHALMIC at 04:01

## 2021-08-01 RX ADMIN — TOBRAMYCIN 1 DROP: 3 SOLUTION OPHTHALMIC at 12:10

## 2021-08-01 RX ADMIN — Medication 1000 MG: at 08:03

## 2021-08-01 RX ADMIN — TOBRAMYCIN 1 DROP: 3 SOLUTION OPHTHALMIC at 10:10

## 2021-08-01 RX ADMIN — CIPROFLOXACIN HYDROCHLORIDE 1 DROP: 3.5 SOLUTION/ DROPS TOPICAL at 12:10

## 2021-08-01 RX ADMIN — TOBRAMYCIN 1 DROP: 3 SOLUTION OPHTHALMIC at 05:37

## 2021-08-01 RX ADMIN — Medication 10 ML: at 05:38

## 2021-08-01 RX ADMIN — CIPROFLOXACIN 750 MG: 500 TABLET, FILM COATED ORAL at 05:35

## 2021-08-01 RX ADMIN — ENOXAPARIN SODIUM 40 MG: 40 INJECTION SUBCUTANEOUS at 08:03

## 2021-08-01 RX ADMIN — TOBRAMYCIN 1 DROP: 3 SOLUTION OPHTHALMIC at 02:16

## 2021-08-01 RX ADMIN — DOXYCYCLINE HYCLATE 100 MG: 100 CAPSULE ORAL at 08:03

## 2021-08-01 RX ADMIN — CIPROFLOXACIN HYDROCHLORIDE 1 DROP: 3.5 SOLUTION/ DROPS TOPICAL at 02:16

## 2021-08-01 RX ADMIN — CIPROFLOXACIN HYDROCHLORIDE 1 DROP: 3.5 SOLUTION/ DROPS TOPICAL at 08:04

## 2021-08-01 RX ADMIN — CIPROFLOXACIN HYDROCHLORIDE 1 DROP: 3.5 SOLUTION/ DROPS TOPICAL at 04:01

## 2021-08-01 RX ADMIN — TOBRAMYCIN 1 DROP: 3 SOLUTION OPHTHALMIC at 08:04

## 2021-08-01 RX ADMIN — ASPIRIN 81 MG: 81 TABLET ORAL at 08:03

## 2021-08-01 RX ADMIN — CIPROFLOXACIN HYDROCHLORIDE 1 DROP: 3.5 SOLUTION/ DROPS TOPICAL at 00:08

## 2021-08-01 RX ADMIN — TOBRAMYCIN 1 DROP: 3 SOLUTION OPHTHALMIC at 00:08

## 2021-08-01 RX ADMIN — FUROSEMIDE 40 MG: 40 TABLET ORAL at 08:03

## 2021-08-01 RX ADMIN — CIPROFLOXACIN HYDROCHLORIDE 1 DROP: 3.5 SOLUTION/ DROPS TOPICAL at 05:37

## 2021-08-01 RX ADMIN — VITAMIN D, TAB 1000IU (100/BT) 1000 UNITS: 25 TAB at 08:03

## 2021-08-01 RX ADMIN — SPIRONOLACTONE 25 MG: 25 TABLET ORAL at 08:03

## 2021-08-01 RX ADMIN — CIPROFLOXACIN HYDROCHLORIDE 1 DROP: 3.5 SOLUTION/ DROPS TOPICAL at 10:11

## 2021-08-01 NOTE — PROGRESS NOTES
Patient is up for discharge. Patient will be getting discharged home with no supportive care needs at this time. Care Management Interventions  PCP Verified by CM: Yes (Dr. Emily Almaraz)  Mode of Transport at Discharge:  Other (see comment) (Family)  Transition of Care Consult (CM Consult): Discharge Planning  Discharge Durable Medical Equipment: No  Physical Therapy Consult: No  Occupational Therapy Consult: No  Speech Therapy Consult: No  Current Support Network: Family Lives [place], Own Home  Confirm Follow Up Transport: Family  The Plan for Transition of Care is Related to the Following Treatment Goals : Return  home and back to his baseline  Discharge Location  Discharge Placement: Home

## 2021-08-01 NOTE — DISCHARGE INSTRUCTIONS
Patient Education        Corneal Ulcer: Care Instructions  Your Care Instructions     The cornea is the clear surface that covers the front of the eye. It directs and focuses light onto the lens of the eye. When the cornea is inflamed, injured, or infected, a sore can form. The sore is called a corneal ulcer. It is very painful and can make the eye red, hard to open, and sensitive to light. The sore may feel like something is caught in your eye. Corneal ulcers can be caused by infection. They can also be linked to problems with the immune system. Wearing contact lenses raises your risk for corneal ulcer, especially if you wear them while you sleep. To see if you have a corneal ulcer, your doctor looks at your eye with dye on it and tests your vision. If your doctor needs to learn what kind of infection to treat, he or she may also take a tiny sample of tissue for testing. Your doctor may start treating your eye with antibiotic eyedrops or ointment right away. This is because infection with bacteria is a common cause of corneal ulcer. If tests show that you have another kind of infection, your doctor will change your medicine. A corneal ulcer is serious. Without prompt treatment, you could lose vision in your eye. Be sure to follow your doctor's care instructions. Your doctor may send you to an eye specialist (ophthalmologist). Follow-up care is a key part of your treatment and safety. Be sure to make and go to all appointments, and call your doctor if you are having problems. It's also a good idea to know your test results and keep a list of the medicines you take. How can you care for yourself at home? · Use the prescribed eyedrops or ointment as directed. At first, this may be every 1 to 2 hours. Be sure the dropper or bottle tip is clean. To put in eyedrops or ointment:  ? Tilt your head back, and pull your lower eyelid down with one finger. ? Drop or squirt the medicine inside the lower lid.   ? Close your eye for 30 to 60 seconds to let the drops or ointment move around. ? Do not touch the ointment or dropper tip to your eyelashes or any other surface. · Do not use your contact lens in your hurt eye until your doctor says you can. · Wear sunglasses to help relieve pain from bright light. · Be safe with medicines. Read and follow all instructions on the label. ? Ask your doctor if you can take an over-the-counter pain medicine, such as acetaminophen (Tylenol), ibuprofen (Advil, Motrin), or naproxen (Aleve). ? If the doctor gave you a prescription medicine for pain, take it as prescribed. · See your doctor for checkups as often as scheduled. At first, this may be daily to make sure your eye is healing. When should you call for help? Call your doctor now or seek immediate medical care if:    · You have new or worse eye pain.     · You have symptoms of an eye infection, such as:  ? Pus or thick discharge coming from the eye.  ? Redness or swelling around the eye.  ? A fever.     · You have vision changes.     · It feels like there is something in your eye.     · Light hurts your eye. Watch closely for changes in your health, and be sure to contact your doctor if:    · You do not get better as expected. Where can you learn more? Go to http://yuan-zi.info/  Enter D726 in the search box to learn more about \"Corneal Ulcer: Care Instructions. \"  Current as of: August 31, 2020               Content Version: 12.8  © 2006-2021 [employer]. Care instructions adapted under license by [employer] (which disclaims liability or warranty for this information). If you have questions about a medical condition or this instruction, always ask your healthcare professional. [address] any warranty or liability for your use of this information.

## 2021-08-01 NOTE — PROGRESS NOTES
Problem: Falls - Risk of  Goal: *Absence of Falls  Description: Document Tin Rolon Fall Risk and appropriate interventions in the flowsheet.   Outcome: Progressing Towards Goal  Note: Fall Risk Interventions:  Mobility Interventions: Bed/chair exit alarm         Medication Interventions: Bed/chair exit alarm         History of Falls Interventions: Bed/chair exit alarm         Problem: Patient Education: Go to Patient Education Activity  Goal: Patient/Family Education  Outcome: Progressing Towards Goal     Problem: Pain  Goal: *Control of Pain  Outcome: Progressing Towards Goal  Goal: *PALLIATIVE CARE:  Alleviation of Pain  Outcome: Progressing Towards Goal     Problem: Patient Education: Go to Patient Education Activity  Goal: Patient/Family Education  Outcome: Progressing Towards Goal

## 2021-08-01 NOTE — DISCHARGE SUMMARY
Hospitalist Discharge Summary     Name:  Constantino Vieyra    Age:61 y.o. 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  ICD-9-CM: 379.09  2021 - Present Unknown        * (Principal) Corneal ulcer, right ICD-10-CM: H16.001  ICD-9-CM: 370.00  2021 - Present Unknown        [facility] ICD-10-CM: C34.90  ICD-9-CM: 162.9  2021 - Present Unknown        On antineoplastic chemotherapy ICD-10-CM: Z79.899  ICD-9-CM: V58.69  2021 - Present Unknown                 Discharge Diagnosis: Pseudomonas keratitis and scleritis  Did Patient have Sepsis (YES OR NO): no      Hospital Course/Interval history:  Patient is a 67-year-old man with past medical history significant for CAD, adenocarcinoma of lung, on chemotherapy presented to the ED referred by ophthalmology for failed outpatient therapy for eye infection. Apparently, patient got injury to his eyes while cutting the grass. Was started on oral antibiotics and as well as eyedrops without any significant improvement and ultimately developed infective keratitis. Per report, patient has pansensitive Pseudomonas from the culture. Ophthalmologist is following and evaluating for possible surgical intervention.     2021: As per patient is feeling same. He is able to open his eyes so he feels may be slightly better.   Denies any fever, chills, nausea or vomiting     Rest of the system negative except mentioned above.    Assessment/Plan:  Pansensitive Pseudomonas keratitis and scleritis OD: Patient presenting as a direct admission from ophthalmology with pansensitive Pseudomonas keratitis and scleritis with outpatient failure of oral and OD antibiotic therapy. Patient initially started on IV Zosyn in addition to his outpatient OD regimen and had some clinical improvement per ophthalmology. Infectious disease was consulted for transitioning to oral antibiotics and recommended high-dose Cipro at 750 mg p.o. twice daily and doxycycline 100 mg p.o. twice daily. Patient's fortified tobramycin every hour OD moxifloxacin every hour OD and fortified vancomycin 4 times daily OD are continued at time of discharge. Patient has follow-up appointment with ophthalmology on an outpatient in a.m. At this time there is an unclear duration of therapy for above antibiotics and will defer to ophthalmology on an outpatient basis as to appropriateness as to when to discontinue antibiotic therapy      CHF/CAD: No acute process during hospitalization. Patient's home regimen was resumed at discharge.       Adenocarcinoma of lung, on chemotherapy: No acute process during hospitalization. Infectious disease recommending against further chemotherapy or radiation therapy until patient has cleared infection.       Consults:   IP CONSULT TO OPHTHALMOLOGY  IP CONSULT TO INFECTIOUS DISEASES     Disposition: Home or Self Care  Diet:   DIET ADULT  Code Status: Full Code      Follow up labs/imaging: None  Follow up with PCP within 1 week. Ophthalmology tomorrow  Pending labs/studies: None    Current Discharge Medication List      START taking these medications    Details   doxycycline (VIBRAMYCIN) 100 mg capsule Take 1 Capsule by mouth every twelve (12) hours for 14 days.   Qty: 28 Capsule, Refills: 0  Start date: 8/1/2021, End date: 8/15/2021      tobramycin 0.3 % drop 100 Drop, tobramycin 40 mg/mL soln 80 mg Apply 1 Drop to eye every two (2) hours for 14 days. Qty: 1 Bottle, Refills: 0  Start date: 8/1/2021, End date: 8/15/2021         CONTINUE these medications which have CHANGED    Details   ciprofloxacin HCl (CIPRO) 750 mg tablet Take 1 Tablet by mouth every twelve (12) hours every twelve (12) hours for 14 days. Qty: 28 Tablet, Refills: 0  Start date: 8/1/2021, End date: 8/15/2021      sterile water (preservative free) soln 10 mL with vancomycin 500 mg solr 500 mg Administer 1 Drop to right eye two (2) times a day. Qty: 1 Bottle, Refills: 0  Start date: 8/1/2021         CONTINUE these medications which have NOT CHANGED    Details   moxifloxacin (VIGAMOX) 0.5 % ophthalmic solution Administer 1 Drop to right eye every  one (1) hour. ascorbic acid, vitamin C, (Vitamin C) 1,000 mg tablet Take 1,000 mg by mouth daily. folic acid (FOLVITE) 1 mg tablet Take 1 mg by mouth daily. aspirin delayed-release 81 mg tablet Take 81 mg by mouth daily. clopidogreL (Plavix) 75 mg tab Take 75 mg by mouth daily. atorvastatin (LIPITOR) 80 mg tablet Take 80 mg by mouth nightly. furosemide (LASIX) 40 mg tablet Take 40 mg by mouth daily. spironolactone (ALDACTONE) 25 mg tablet Take 25 mg by mouth daily.          STOP taking these medications       tobramycin (TOBREX) 0.3 % ophthalmic solution Comments:   Reason for Stopping:         doxycycline (MONODOX) 100 mg capsule Comments:   Reason for Stopping:               Medications Discontinued During This Encounter   Medication Reason    vancomycin (VANCOCIN) 1,000 mg in 0.9% sodium chloride 250 mL (VIAL-MATE) DOSE ADJUSTMENT    lactated Ringers infusion     doxycycline (VIBRAMYCIN) capsule 100 mg     vancomycin (VANCOCIN) 1,000 mg in 0.9% sodium chloride 250 mL (VIAL-MATE)     sodium chloride (NS) flush 5-40 mL     moxifloxacin (VIGAMOX) 0.5 % ophthalmic solution 1 Drop  (Patient Supplied) Formulary Change    fortified vancomycin 50 mg/ml eye drops      doxycycline (VIBRAMYCIN) 100 mg in 0.9% sodium chloride (MBP/ADV) 100 mL MBP     doxycycline (VIBRAMYCIN) 100 mg in 0.9% sodium chloride (MBP/ADV) 100 mL MBP     doxycycline (VIBRAMYCIN) capsule 100 mg     piperacillin-tazobactam (ZOSYN) 4.5 g in 0.9% sodium chloride (MBP/ADV) 100 mL MBP     fortified tobramycin (NEBCIN) 15 mg/ml eye drops      ciprofloxacin HCl (CILOXAN) 0.3 % ophthalmic solution 1 Drop     fortified vancomycin 50 mg/ml eye drops           Follow Up Orders: Follow-up Appointments   Procedures    FOLLOW UP VISIT Appointment in: 3 - 5 Days Ophthalmology-within 2 to 3 days PCP within 1 week     Ophthalmology-within 2 to 3 days    PCP within 1 week     Standing Status:   Standing     Number of Occurrences:   1     Order Specific Question:   Appointment in     Answer:   3 - 5 Days         Follow-up Information     Follow up With Specialties Details Why Contact Info    Kenneth Rios MD Family Medicine  call in the morning for a one week follow up Lion Henry MD Ophthalmology  Per MD note, office will call you monday morning with an appointment for that day  [address] [place] [id number]              Discharge Exam:    Patient Vitals for the past 24 hrs:   Temp Pulse Resp BP SpO2   08/01/21 1124 98.4 °F (36.9 °C) 91 16 132/79 99 %   08/01/21 0750 97.9 °F (36.6 °C) 95 16 (!) 149/88 98 %   08/01/21 0331 97.6 °F (36.4 °C) 89 17 128/79 99 %   07/31/21 2345 98 °F (36.7 °C) 88 18 134/65 98 %   07/31/21 1923 97.9 °F (36.6 °C) 97 18 (!) 146/64 95 %   07/31/21 1600 98.4 °F (36.9 °C) 81 18 134/85 94 %     Oxygen Therapy  O2 Sat (%): 99 % (08/01/21 1124)  Pulse via Oximetry: 82 beats per minute (07/24/21 1644)  O2 Device: None (Room air) (07/31/21 5423)    Estimated body mass index is 24.36 kg/m² as calculated from the following:    Height as of this encounter: 5' 6\" (1.676 m). Weight as of this encounter: 68.4 kg (150 lb 14.4 oz).     No intake or output data in the 24 hours ending 08/01/21 2109    *Note that automatically entered I/Os may not be accurate; dependent on patient compliance with collection and accurate  by assistants. Physical Exam:    General:  No acute distress, speaking in full sentences, no use of accessory muscles   HEENT:  Pupils equal and reactive to light and accommodation, oropharynx is clear   Neck:  Right eye positive for discharge and visible hyper on that is green and swollen as well as injected sclera supple, no lymphadenopathy, no JVD   Lungs:  Clear to auscultation bilaterally   CV:  Regular rate and rhythm with normal S1 and S2   Abdomen: Soft, nontender, nondistended, normoactive bowel sounds   Extremities:  No cyanosis clubbing or edema   Neuro:  Nonfocal, A&O x3   Psych:  Normal affect       All Labs from Last 24 Hrs:  Recent Results (from the past 24 hour(s))   CBC WITH AUTOMATED DIFF    Collection Time: 08/01/21  5:34 AM   Result Value Ref Range    WBC 5.8 4.3 - 11.1 K/uL    RBC 4.34 4.23 - 5.6 M/uL    HGB 13.0 (L) 13.6 - 17.2 g/dL    HCT 39.4 (L) 41.1 - 50.3 %    MCV 90.8 79.6 - 97.8 FL    MCH 30.0 26.1 - 32.9 PG    MCHC 33.0 31.4 - 35.0 g/dL    RDW 14.8 (H) 11.9 - 14.6 %    PLATELET 404 392 - 442 K/uL    MPV 11.0 9.4 - 12.3 FL    ABSOLUTE NRBC 0.00 0.0 - 0.2 K/uL    DF AUTOMATED      NEUTROPHILS 63 43 - 78 %    LYMPHOCYTES 22 13 - 44 %    MONOCYTES 12 4.0 - 12.0 %    EOSINOPHILS 1 0.5 - 7.8 %    BASOPHILS 1 0.0 - 2.0 %    IMMATURE GRANULOCYTES 2 0.0 - 5.0 %    ABS. NEUTROPHILS 3.6 1.7 - 8.2 K/UL    ABS. LYMPHOCYTES 1.3 0.5 - 4.6 K/UL    ABS. MONOCYTES 0.7 0.1 - 1.3 K/UL    ABS. EOSINOPHILS 0.1 0.0 - 0.8 K/UL    ABS. BASOPHILS 0.0 0.0 - 0.2 K/UL    ABS. IMM.  GRANS. 0.1 0.0 - 0.5 K/UL   METABOLIC PANEL, BASIC    Collection Time: 08/01/21  5:34 AM   Result Value Ref Range    Sodium 139 136 - 145 mmol/L    Potassium 3.4 (L) 3.5 - 5.1 mmol/L    Chloride 104 98 - 107 mmol/L    CO2 27 21 - 32 mmol/L    Anion gap 8 7 - 16 mmol/L    Glucose 84 65 - 100 mg/dL    BUN 17 8 - 23 MG/DL    Creatinine 0.75 (L) 0.8 - 1.5 MG/DL    GFR est AA >60 >60 ml/min/1.73m2    GFR est non-AA >60 >60 ml/min/1.73m2    Calcium 9.3 8.3 - 10.4 MG/DL       All Micro Results     Procedure Component Value Units Date/Time    BLOOD CULTURE [[id number]] Collected: 07/24/21 1452    Order Status: Completed Specimen: Blood Updated: 07/29/21 0708     Special Requests: --        NO SPECIAL REQUESTS  RIGHT  FOREARM       Culture result: NO GROWTH 5 DAYS       BLOOD CULTURE [[id number]] Collected: 07/24/21 1530    Order Status: Completed Specimen: Blood Updated: 07/29/21 0708     Special Requests: --        LEFT  HAND       Culture result: NO GROWTH 5 DAYS             SARS-CoV-2 Lab Results  \"Novel Coronavirus\" Test: No results found for: COV2NT   \"Emergent Disease\" Test: No results found for: EDPR  \"SARS-COV-2\" Test: No results found for: XGCOVT  Rapid Test:   No results found for: COVR         Diagnostic Imaging/Tests:   XR CHEST PORT    Result Date: 7/24/2021  History: Infection Exam: portable chest Comparison: None Findings: A port overlies the right chest wall. No focal alveolar infiltrate. Median sternotomy wires present. No pneumothorax. IMPRESSIONs: No acute findings. Echocardiogram/EKG results:  No results found for this visit on 07/24/21.     EKG Results     Procedure 720 Value Units Date/Time    EKG, 12 LEAD, INITIAL [[id number]] Collected: 07/24/21 1526    Order Status: Completed Updated: 07/25/21 0611     Ventricular Rate 82 BPM      Atrial Rate 82 BPM      P-R Interval 200 ms      QRS Duration 90 ms      Q-T Interval 346 ms      QTC Calculation (Bezet) 404 ms      Calculated P Axis 75 degrees      Calculated R Axis 78 degrees      Calculated T Axis -80 degrees      Diagnosis --       Normal sinus rhythm  late r wave transition  T wave abnormality, consider inferolateral ischemia  Abnormal ECG  No previous ECGs available  Confirmed by Phillip Vaughan ([id number]) on 7/25/2021 6:10:53 AM            Results for orders placed or performed during the hospital encounter of 07/24/21   EKG, 12 LEAD, INITIAL   Result Value Ref Range    Ventricular Rate 82 BPM    Atrial Rate 82 BPM    P-R Interval 200 ms    QRS Duration 90 ms    Q-T Interval 346 ms    QTC Calculation (Bezet) 404 ms    Calculated P Axis 75 degrees    Calculated R Axis 78 degrees    Calculated T Axis -80 degrees    Diagnosis         Normal sinus rhythm  late r wave transition  T wave abnormality, consider inferolateral ischemia  Abnormal ECG  No previous ECGs available  Confirmed by [place] ([id number]) on 7/25/2021 6:10:53 AM         Procedures done this admission:  * No surgery found *      Time spent in patient discharge planning and coordination 39 minutes. Part of this note may have been written by using a voice dictation software. The note has been proof read but may still contain some grammatical/other typographical errors.       Signed By: Monita Sandifer, DO [facility]    August 1, 2021  1:49 PM

## 2021-08-01 NOTE — PROGRESS NOTES
Problem: Falls - Risk of  Goal: *Absence of Falls  Description: Document Hammad Boykin Fall Risk and appropriate interventions in the flowsheet.   Outcome: Resolved/Met  Note: Fall Risk Interventions:  Mobility Interventions: Bed/chair exit alarm         Medication Interventions: Bed/chair exit alarm         History of Falls Interventions: Bed/chair exit alarm         Problem: Patient Education: Go to Patient Education Activity  Goal: Patient/Family Education  Outcome: Resolved/Met     Problem: Pain  Goal: *Control of Pain  Outcome: Resolved/Met  Goal: *PALLIATIVE CARE:  Alleviation of Pain  Outcome: Resolved/Met     Problem: Patient Education: Go to Patient Education Activity  Goal: Patient/Family Education  Outcome: Resolved/Met

## 2022-03-18 PROBLEM — H16.001 CORNEAL ULCER, RIGHT: Status: ACTIVE | Noted: 2021-07-24

## 2022-03-19 PROBLEM — Z79.899 ON ANTINEOPLASTIC CHEMOTHERAPY: Status: ACTIVE | Noted: 2021-07-24

## 2022-03-19 PROBLEM — C34.90 ADENOCARCINOMA OF LUNG (HCC): Status: ACTIVE | Noted: 2021-07-24

## 2022-03-20 PROBLEM — H15.091: Status: ACTIVE | Noted: 2021-07-27
